# Patient Record
Sex: FEMALE | Race: BLACK OR AFRICAN AMERICAN | URBAN - NONMETROPOLITAN AREA
[De-identification: names, ages, dates, MRNs, and addresses within clinical notes are randomized per-mention and may not be internally consistent; named-entity substitution may affect disease eponyms.]

---

## 2017-02-18 ENCOUNTER — OFFICE VISIT (OUTPATIENT)
Dept: URGENT CARE | Age: 32
End: 2017-02-18
Payer: MEDICAID

## 2017-02-18 VITALS
HEIGHT: 65 IN | DIASTOLIC BLOOD PRESSURE: 85 MMHG | WEIGHT: 229 LBS | TEMPERATURE: 98.9 F | OXYGEN SATURATION: 98 % | HEART RATE: 95 BPM | BODY MASS INDEX: 38.15 KG/M2 | SYSTOLIC BLOOD PRESSURE: 145 MMHG

## 2017-02-18 DIAGNOSIS — J20.9 ACUTE BRONCHITIS, UNSPECIFIED ORGANISM: Primary | ICD-10-CM

## 2017-02-18 DIAGNOSIS — J98.01 BRONCHOSPASM: ICD-10-CM

## 2017-02-18 PROCEDURE — 99202 OFFICE O/P NEW SF 15 MIN: CPT | Performed by: FAMILY MEDICINE

## 2017-02-18 RX ORDER — METHYLPREDNISOLONE 4 MG/1
TABLET ORAL
Qty: 21 TABLET | Refills: 0 | Status: SHIPPED | OUTPATIENT
Start: 2017-02-18 | End: 2017-02-24

## 2017-02-18 RX ORDER — HYDROCHLOROTHIAZIDE 25 MG/1
TABLET ORAL
Refills: 5 | COMMUNITY
Start: 2017-02-10

## 2017-02-18 RX ORDER — AMLODIPINE BESYLATE 10 MG/1
TABLET ORAL
Refills: 5 | COMMUNITY
Start: 2017-02-10

## 2017-02-18 RX ORDER — DEXTROMETHORPHAN HYDROBROMIDE AND PROMETHAZINE HYDROCHLORIDE 15; 6.25 MG/5ML; MG/5ML
5 SYRUP ORAL 4 TIMES DAILY PRN
Qty: 150 ML | Refills: 0 | Status: SHIPPED | OUTPATIENT
Start: 2017-02-18 | End: 2017-02-25

## 2017-02-18 RX ORDER — AMOXICILLIN 875 MG/1
875 TABLET, COATED ORAL 2 TIMES DAILY
Qty: 14 TABLET | Refills: 0 | Status: SHIPPED | OUTPATIENT
Start: 2017-02-18 | End: 2017-02-25

## 2017-02-18 ASSESSMENT — ENCOUNTER SYMPTOMS
RHINORRHEA: 1
SORE THROAT: 1
WHEEZING: 1
VOMITING: 0
SINUS PAIN: 1
COUGH: 1

## 2017-04-20 ENCOUNTER — APPOINTMENT (OUTPATIENT)
Dept: GENERAL RADIOLOGY | Facility: HOSPITAL | Age: 32
End: 2017-04-20

## 2017-04-20 PROCEDURE — 71020 HC CHEST PA AND LATERAL: CPT

## 2017-05-04 ENCOUNTER — HOSPITAL ENCOUNTER (EMERGENCY)
Facility: HOSPITAL | Age: 32
Discharge: HOME | End: 2017-05-04
Attending: FAMILY MEDICINE | Admitting: FAMILY MEDICINE

## 2017-05-04 ENCOUNTER — APPOINTMENT (OUTPATIENT)
Dept: GENERAL RADIOLOGY | Facility: HOSPITAL | Age: 32
End: 2017-05-04

## 2017-05-04 VITALS
DIASTOLIC BLOOD PRESSURE: 85 MMHG | OXYGEN SATURATION: 97 % | BODY MASS INDEX: 37.49 KG/M2 | HEIGHT: 65 IN | TEMPERATURE: 98.2 F | HEART RATE: 94 BPM | WEIGHT: 225 LBS | RESPIRATION RATE: 22 BRPM | SYSTOLIC BLOOD PRESSURE: 154 MMHG

## 2017-05-04 DIAGNOSIS — J45.21 REACTIVE AIRWAY DISEASE, MILD INTERMITTENT, WITH ACUTE EXACERBATION: ICD-10-CM

## 2017-05-04 DIAGNOSIS — J40 BRONCHITIS: Primary | ICD-10-CM

## 2017-05-04 PROCEDURE — 94799 UNLISTED PULMONARY SVC/PX: CPT

## 2017-05-04 PROCEDURE — 94640 AIRWAY INHALATION TREATMENT: CPT

## 2017-05-04 PROCEDURE — 96372 THER/PROPH/DIAG INJ SC/IM: CPT

## 2017-05-04 PROCEDURE — 99282 EMERGENCY DEPT VISIT SF MDM: CPT

## 2017-05-04 PROCEDURE — 25010000002 DEXAMETHASONE PER 1 MG: Performed by: FAMILY MEDICINE

## 2017-05-04 PROCEDURE — 71010 HC CHEST PA OR AP: CPT

## 2017-05-04 RX ORDER — IPRATROPIUM BROMIDE AND ALBUTEROL SULFATE 2.5; .5 MG/3ML; MG/3ML
3 SOLUTION RESPIRATORY (INHALATION) ONCE
Status: COMPLETED | OUTPATIENT
Start: 2017-05-04 | End: 2017-05-04

## 2017-05-04 RX ORDER — METHYLPREDNISOLONE 4 MG/1
TABLET ORAL
Qty: 21 TABLET | Refills: 0 | Status: SHIPPED | OUTPATIENT
Start: 2017-05-04 | End: 2017-05-29

## 2017-05-04 RX ORDER — AZITHROMYCIN 250 MG/1
250 TABLET, FILM COATED ORAL DAILY
Qty: 6 TABLET | Refills: 0 | Status: SHIPPED | OUTPATIENT
Start: 2017-05-04 | End: 2017-05-29

## 2017-05-04 RX ORDER — DEXAMETHASONE SODIUM PHOSPHATE 10 MG/ML
10 INJECTION INTRAMUSCULAR; INTRAVENOUS ONCE
Status: COMPLETED | OUTPATIENT
Start: 2017-05-04 | End: 2017-05-04

## 2017-05-04 RX ORDER — CETIRIZINE HYDROCHLORIDE 10 MG/1
10 TABLET ORAL DAILY
Qty: 15 TABLET | Refills: 0 | Status: SHIPPED | OUTPATIENT
Start: 2017-05-04 | End: 2017-05-19

## 2017-05-04 RX ADMIN — DEXAMETHASONE SODIUM PHOSPHATE 10 MG: 10 INJECTION, SOLUTION INTRAMUSCULAR; INTRAVENOUS at 05:35

## 2017-05-04 RX ADMIN — IPRATROPIUM BROMIDE AND ALBUTEROL SULFATE 3 ML: .5; 3 SOLUTION RESPIRATORY (INHALATION) at 05:44

## 2017-05-29 ENCOUNTER — APPOINTMENT (OUTPATIENT)
Dept: GENERAL RADIOLOGY | Facility: HOSPITAL | Age: 32
End: 2017-05-29

## 2017-05-29 ENCOUNTER — HOSPITAL ENCOUNTER (INPATIENT)
Facility: HOSPITAL | Age: 32
LOS: 3 days | Discharge: HOME | End: 2017-06-01
Attending: EMERGENCY MEDICINE | Admitting: FAMILY MEDICINE

## 2017-05-29 DIAGNOSIS — R09.02 HYPOXIA: Primary | ICD-10-CM

## 2017-05-29 LAB
ALBUMIN SERPL-MCNC: 4.3 G/DL (ref 3.5–5)
ALBUMIN/GLOB SERPL: 1.3 G/DL (ref 1.1–2.5)
ALP SERPL-CCNC: 96 U/L (ref 24–120)
ALT SERPL W P-5'-P-CCNC: 25 U/L (ref 0–54)
ANION GAP SERPL CALCULATED.3IONS-SCNC: 16 MMOL/L (ref 4–13)
ARTERIAL PATENCY WRIST A: ABNORMAL
AST SERPL-CCNC: 31 U/L (ref 7–45)
ATMOSPHERIC PRESS: ABNORMAL MMHG
BASE EXCESS BLDA CALC-SCNC: 1.2 MMOL/L (ref -2–2)
BASOPHILS # BLD AUTO: 0.02 10*3/MM3 (ref 0–0.2)
BASOPHILS NFR BLD AUTO: 0.1 % (ref 0–2)
BDY SITE: ABNORMAL
BILIRUB SERPL-MCNC: 0.5 MG/DL (ref 0.1–1)
BUN BLD-MCNC: 14 MG/DL (ref 5–21)
BUN/CREAT SERPL: 13.1 (ref 7–25)
CALCIUM SPEC-SCNC: 9.4 MG/DL (ref 8.4–10.4)
CHLORIDE SERPL-SCNC: 103 MMOL/L (ref 98–110)
CO2 SERPL-SCNC: 23 MMOL/L (ref 24–31)
CREAT BLD-MCNC: 1.07 MG/DL (ref 0.5–1.4)
D DIMER PPP FEU-MCNC: 0.44 MG/L (FEU) (ref 0–0.5)
DEPRECATED RDW RBC AUTO: 40.8 FL (ref 40–54)
EOSINOPHIL # BLD AUTO: 0.63 10*3/MM3 (ref 0–0.7)
EOSINOPHIL NFR BLD AUTO: 3.8 % (ref 0–4)
ERYTHROCYTE [DISTWIDTH] IN BLOOD BY AUTOMATED COUNT: 13.1 % (ref 12–15)
GAS FLOW AIRWAY: 2 LPM
GFR SERPL CREATININE-BSD FRML MDRD: 72 ML/MIN/1.73
GLOBULIN UR ELPH-MCNC: 3.3 GM/DL
GLUCOSE BLD-MCNC: 96 MG/DL (ref 70–100)
HCO3 BLDA-SCNC: 23.7 MMOL/L (ref 22–26)
HCT VFR BLD AUTO: 37 % (ref 37–47)
HGB BLD-MCNC: 12.8 G/DL (ref 12–16)
LYMPHOCYTES # BLD AUTO: 2.28 10*3/MM3 (ref 0.72–4.86)
LYMPHOCYTES NFR BLD AUTO: 13.8 % (ref 15–45)
Lab: ABNORMAL
MCH RBC QN AUTO: 30.5 PG (ref 28–32)
MCHC RBC AUTO-ENTMCNC: 34.6 G/DL (ref 33–36)
MCV RBC AUTO: 88.1 FL (ref 82–98)
MODALITY: ABNORMAL
MONOCYTES # BLD AUTO: 1.18 10*3/MM3 (ref 0.19–1.3)
MONOCYTES NFR BLD AUTO: 7.1 % (ref 4–12)
NEUTROPHILS # BLD AUTO: 12.47 10*3/MM3 (ref 1.87–8.4)
NEUTROPHILS NFR BLD AUTO: 75.2 % (ref 39–78)
NOTIFIED BY: ABNORMAL
NOTIFIED WHO: ABNORMAL
NT-PROBNP SERPL-MCNC: 118 PG/ML (ref 0–450)
PCO2 BLDA: 31.9 MM HG (ref 35–45)
PH BLDA: 7.49 PH UNITS (ref 7.35–7.45)
PLAT MORPH BLD: NORMAL
PLATELET # BLD AUTO: 437 10*3/MM3 (ref 130–400)
PMV BLD AUTO: 10.1 FL (ref 6–12)
PO2 BLDA: 43.5 MM HG (ref 80–100)
POTASSIUM BLD-SCNC: 3.4 MMOL/L (ref 3.5–5.3)
PROT SERPL-MCNC: 7.6 G/DL (ref 6.3–8.7)
RBC # BLD AUTO: 4.2 10*6/MM3 (ref 4.2–5.4)
RBC MORPH BLD: NORMAL
SAO2 % BLDCOA: 83.7 % (ref 94–100)
SAO2 % BLDCOA: 83.7 % (ref 94–100)
SODIUM BLD-SCNC: 142 MMOL/L (ref 135–145)
WBC MORPH BLD: NORMAL
WBC NRBC COR # BLD: 16.58 10*3/MM3 (ref 4.8–10.8)

## 2017-05-29 PROCEDURE — 25010000002 CEFTRIAXONE: Performed by: INTERNAL MEDICINE

## 2017-05-29 PROCEDURE — 94640 AIRWAY INHALATION TREATMENT: CPT

## 2017-05-29 PROCEDURE — 93010 ELECTROCARDIOGRAM REPORT: CPT | Performed by: INTERNAL MEDICINE

## 2017-05-29 PROCEDURE — 80053 COMPREHEN METABOLIC PANEL: CPT | Performed by: EMERGENCY MEDICINE

## 2017-05-29 PROCEDURE — 94799 UNLISTED PULMONARY SVC/PX: CPT

## 2017-05-29 PROCEDURE — 25010000002 HYDRALAZINE PER 20 MG: Performed by: INTERNAL MEDICINE

## 2017-05-29 PROCEDURE — 25010000002 METHYLPREDNISOLONE PER 125 MG: Performed by: EMERGENCY MEDICINE

## 2017-05-29 PROCEDURE — 85007 BL SMEAR W/DIFF WBC COUNT: CPT | Performed by: EMERGENCY MEDICINE

## 2017-05-29 PROCEDURE — 82803 BLOOD GASES ANY COMBINATION: CPT

## 2017-05-29 PROCEDURE — 93005 ELECTROCARDIOGRAM TRACING: CPT | Performed by: EMERGENCY MEDICINE

## 2017-05-29 PROCEDURE — 25010000002 ENOXAPARIN PER 10 MG: Performed by: INTERNAL MEDICINE

## 2017-05-29 PROCEDURE — 71010 HC CHEST PA OR AP: CPT

## 2017-05-29 PROCEDURE — 25010000002 METHYLPREDNISOLONE PER 40 MG: Performed by: INTERNAL MEDICINE

## 2017-05-29 PROCEDURE — 25010000002 AZITHROMYCIN: Performed by: INTERNAL MEDICINE

## 2017-05-29 PROCEDURE — 36600 WITHDRAWAL OF ARTERIAL BLOOD: CPT

## 2017-05-29 PROCEDURE — 99284 EMERGENCY DEPT VISIT MOD MDM: CPT

## 2017-05-29 PROCEDURE — 83880 ASSAY OF NATRIURETIC PEPTIDE: CPT | Performed by: EMERGENCY MEDICINE

## 2017-05-29 PROCEDURE — 85379 FIBRIN DEGRADATION QUANT: CPT | Performed by: EMERGENCY MEDICINE

## 2017-05-29 PROCEDURE — 85025 COMPLETE CBC W/AUTO DIFF WBC: CPT | Performed by: EMERGENCY MEDICINE

## 2017-05-29 PROCEDURE — 25010000002 MAGNESIUM SULFATE IN D5W 1G/100ML (PREMIX) 1-5 GM/100ML-% SOLUTION: Performed by: INTERNAL MEDICINE

## 2017-05-29 RX ORDER — IPRATROPIUM BROMIDE AND ALBUTEROL SULFATE 2.5; .5 MG/3ML; MG/3ML
3 SOLUTION RESPIRATORY (INHALATION) ONCE
Status: COMPLETED | OUTPATIENT
Start: 2017-05-29 | End: 2017-05-29

## 2017-05-29 RX ORDER — POTASSIUM CHLORIDE 750 MG/1
40 CAPSULE, EXTENDED RELEASE ORAL ONCE
Status: COMPLETED | OUTPATIENT
Start: 2017-05-29 | End: 2017-05-29

## 2017-05-29 RX ORDER — ONDANSETRON 2 MG/ML
4 INJECTION INTRAMUSCULAR; INTRAVENOUS EVERY 6 HOURS PRN
Status: DISCONTINUED | OUTPATIENT
Start: 2017-05-29 | End: 2017-06-01 | Stop reason: HOSPADM

## 2017-05-29 RX ORDER — SODIUM CHLORIDE 9 MG/ML
125 INJECTION, SOLUTION INTRAVENOUS CONTINUOUS
Status: DISCONTINUED | OUTPATIENT
Start: 2017-05-29 | End: 2017-05-30

## 2017-05-29 RX ORDER — METHYLPREDNISOLONE SODIUM SUCCINATE 125 MG/2ML
125 INJECTION, POWDER, LYOPHILIZED, FOR SOLUTION INTRAMUSCULAR; INTRAVENOUS ONCE
Status: COMPLETED | OUTPATIENT
Start: 2017-05-29 | End: 2017-05-29

## 2017-05-29 RX ORDER — AMLODIPINE BESYLATE 10 MG/1
10 TABLET ORAL DAILY
COMMUNITY
End: 2020-08-28

## 2017-05-29 RX ORDER — MAGNESIUM SULFATE 1 G/100ML
1 INJECTION INTRAVENOUS ONCE
Status: COMPLETED | OUTPATIENT
Start: 2017-05-29 | End: 2017-05-29

## 2017-05-29 RX ORDER — HYDROCHLOROTHIAZIDE 25 MG/1
25 TABLET ORAL DAILY
COMMUNITY
End: 2020-08-28

## 2017-05-29 RX ORDER — METHYLPREDNISOLONE SODIUM SUCCINATE 40 MG/ML
40 INJECTION, POWDER, LYOPHILIZED, FOR SOLUTION INTRAMUSCULAR; INTRAVENOUS EVERY 6 HOURS
Status: DISCONTINUED | OUTPATIENT
Start: 2017-05-29 | End: 2017-05-30

## 2017-05-29 RX ORDER — GUAIFENESIN 600 MG/1
1200 TABLET, EXTENDED RELEASE ORAL EVERY 12 HOURS
Status: DISCONTINUED | OUTPATIENT
Start: 2017-05-29 | End: 2017-06-01 | Stop reason: HOSPADM

## 2017-05-29 RX ORDER — HYDROCHLOROTHIAZIDE 25 MG/1
25 TABLET ORAL DAILY
Status: DISCONTINUED | OUTPATIENT
Start: 2017-05-30 | End: 2017-06-01 | Stop reason: HOSPADM

## 2017-05-29 RX ORDER — HYDRALAZINE HYDROCHLORIDE 20 MG/ML
10 INJECTION INTRAMUSCULAR; INTRAVENOUS EVERY 4 HOURS PRN
Status: DISCONTINUED | OUTPATIENT
Start: 2017-05-29 | End: 2017-06-01 | Stop reason: HOSPADM

## 2017-05-29 RX ORDER — IPRATROPIUM BROMIDE AND ALBUTEROL SULFATE 2.5; .5 MG/3ML; MG/3ML
3 SOLUTION RESPIRATORY (INHALATION)
Status: DISCONTINUED | OUTPATIENT
Start: 2017-05-29 | End: 2017-05-29

## 2017-05-29 RX ORDER — SODIUM CHLORIDE 0.9 % (FLUSH) 0.9 %
10 SYRINGE (ML) INJECTION AS NEEDED
Status: DISCONTINUED | OUTPATIENT
Start: 2017-05-29 | End: 2017-06-01 | Stop reason: HOSPADM

## 2017-05-29 RX ORDER — AMLODIPINE BESYLATE 10 MG/1
10 TABLET ORAL DAILY
Status: DISCONTINUED | OUTPATIENT
Start: 2017-05-30 | End: 2017-06-01 | Stop reason: HOSPADM

## 2017-05-29 RX ORDER — DIAZEPAM 2 MG/1
2 TABLET ORAL EVERY 8 HOURS PRN
Status: DISCONTINUED | OUTPATIENT
Start: 2017-05-29 | End: 2017-06-01 | Stop reason: HOSPADM

## 2017-05-29 RX ORDER — ACETAMINOPHEN 325 MG/1
650 TABLET ORAL EVERY 4 HOURS PRN
Status: DISCONTINUED | OUTPATIENT
Start: 2017-05-29 | End: 2017-06-01 | Stop reason: HOSPADM

## 2017-05-29 RX ORDER — ALBUTEROL SULFATE 2.5 MG/3ML
2.5 SOLUTION RESPIRATORY (INHALATION)
Status: DISCONTINUED | OUTPATIENT
Start: 2017-05-29 | End: 2017-06-01 | Stop reason: HOSPADM

## 2017-05-29 RX ORDER — SODIUM CHLORIDE 0.9 % (FLUSH) 0.9 %
1-10 SYRINGE (ML) INJECTION AS NEEDED
Status: DISCONTINUED | OUTPATIENT
Start: 2017-05-29 | End: 2017-06-01 | Stop reason: HOSPADM

## 2017-05-29 RX ADMIN — DIAZEPAM 2 MG: 2 TABLET ORAL at 20:53

## 2017-05-29 RX ADMIN — METHYLPREDNISOLONE SODIUM SUCCINATE 40 MG: 40 INJECTION, POWDER, FOR SOLUTION INTRAMUSCULAR; INTRAVENOUS at 19:52

## 2017-05-29 RX ADMIN — METHYLPREDNISOLONE SODIUM SUCCINATE 125 MG: 125 INJECTION, POWDER, FOR SOLUTION INTRAMUSCULAR; INTRAVENOUS at 10:30

## 2017-05-29 RX ADMIN — AZITHROMYCIN 500 MG: 500 INJECTION, POWDER, LYOPHILIZED, FOR SOLUTION INTRAVENOUS at 16:29

## 2017-05-29 RX ADMIN — ALBUTEROL SULFATE 2.5 MG: 2.5 SOLUTION RESPIRATORY (INHALATION) at 19:38

## 2017-05-29 RX ADMIN — POTASSIUM CHLORIDE 40 MEQ: 750 CAPSULE, EXTENDED RELEASE ORAL at 14:43

## 2017-05-29 RX ADMIN — METHYLPREDNISOLONE SODIUM SUCCINATE 40 MG: 40 INJECTION, POWDER, FOR SOLUTION INTRAMUSCULAR; INTRAVENOUS at 15:00

## 2017-05-29 RX ADMIN — ALBUTEROL SULFATE 2.5 MG: 2.5 SOLUTION RESPIRATORY (INHALATION) at 15:34

## 2017-05-29 RX ADMIN — CEFTRIAXONE 1 G: 1 INJECTION, POWDER, FOR SOLUTION INTRAMUSCULAR; INTRAVENOUS at 14:43

## 2017-05-29 RX ADMIN — IPRATROPIUM BROMIDE AND ALBUTEROL SULFATE 3 ML: 2.5; .5 SOLUTION RESPIRATORY (INHALATION) at 10:10

## 2017-05-29 RX ADMIN — ALBUTEROL SULFATE 2.5 MG: 2.5 SOLUTION RESPIRATORY (INHALATION) at 23:42

## 2017-05-29 RX ADMIN — MAGNESIUM SULFATE HEPTAHYDRATE 1 G: 1 INJECTION, SOLUTION INTRAVENOUS at 17:48

## 2017-05-29 RX ADMIN — ENOXAPARIN SODIUM 40 MG: 40 INJECTION SUBCUTANEOUS at 14:43

## 2017-05-29 RX ADMIN — SODIUM CHLORIDE 125 ML/HR: 9 INJECTION, SOLUTION INTRAVENOUS at 14:42

## 2017-05-29 RX ADMIN — SODIUM CHLORIDE 125 ML/HR: 9 INJECTION, SOLUTION INTRAVENOUS at 11:00

## 2017-05-29 RX ADMIN — HYDRALAZINE HYDROCHLORIDE 10 MG: 20 INJECTION INTRAMUSCULAR; INTRAVENOUS at 20:53

## 2017-05-29 RX ADMIN — PHENOL 1 SPRAY: 1.5 LIQUID ORAL at 20:52

## 2017-05-29 RX ADMIN — GUAIFENESIN 1200 MG: 600 TABLET, EXTENDED RELEASE ORAL at 19:52

## 2017-05-30 ENCOUNTER — APPOINTMENT (OUTPATIENT)
Dept: CARDIOLOGY | Facility: HOSPITAL | Age: 32
End: 2017-05-30
Attending: INTERNAL MEDICINE

## 2017-05-30 LAB
ANION GAP SERPL CALCULATED.3IONS-SCNC: 13 MMOL/L (ref 4–13)
ARTERIAL PATENCY WRIST A: ABNORMAL
ATMOSPHERIC PRESS: ABNORMAL MMHG
BASE EXCESS BLDA CALC-SCNC: -1.7 MMOL/L (ref -2–2)
BDY SITE: ABNORMAL
BH CV ECHO MEAS - AO MAX PG (FULL): 8.1 MMHG
BH CV ECHO MEAS - AO MAX PG: 18.5 MMHG
BH CV ECHO MEAS - AO MEAN PG (FULL): 5 MMHG
BH CV ECHO MEAS - AO MEAN PG: 11 MMHG
BH CV ECHO MEAS - AO ROOT AREA (BSA CORRECTED): 1.5
BH CV ECHO MEAS - AO ROOT AREA: 7.5 CM^2
BH CV ECHO MEAS - AO ROOT DIAM: 3.1 CM
BH CV ECHO MEAS - AO V2 MAX: 215 CM/SEC
BH CV ECHO MEAS - AO V2 MEAN: 153 CM/SEC
BH CV ECHO MEAS - AO V2 VTI: 43.1 CM
BH CV ECHO MEAS - AVA(I,A): 2.6 CM^2
BH CV ECHO MEAS - AVA(I,D): 2.6 CM^2
BH CV ECHO MEAS - AVA(V,A): 2.6 CM^2
BH CV ECHO MEAS - AVA(V,D): 2.6 CM^2
BH CV ECHO MEAS - BSA(HAYCOCK): 2.2 M^2
BH CV ECHO MEAS - BSA: 2.1 M^2
BH CV ECHO MEAS - BZI_BMI: 37.1 KILOGRAMS/M^2
BH CV ECHO MEAS - BZI_METRIC_HEIGHT: 165.1 CM
BH CV ECHO MEAS - BZI_METRIC_WEIGHT: 101.2 KG
BH CV ECHO MEAS - EDV(CUBED): 88.7 ML
BH CV ECHO MEAS - EDV(TEICH): 90.5 ML
BH CV ECHO MEAS - EF(CUBED): 87.5 %
BH CV ECHO MEAS - EF(TEICH): 81.5 %
BH CV ECHO MEAS - ESV(CUBED): 11.1 ML
BH CV ECHO MEAS - ESV(TEICH): 16.8 ML
BH CV ECHO MEAS - FS: 50 %
BH CV ECHO MEAS - IVS/LVPW: 0.85
BH CV ECHO MEAS - IVSD: 0.93 CM
BH CV ECHO MEAS - LA DIMENSION: 3.5 CM
BH CV ECHO MEAS - LA/AO: 1.1
BH CV ECHO MEAS - LAT PEAK E' VEL: 14.4 CM/SEC
BH CV ECHO MEAS - LV MASS(C)D: 153.1 GRAMS
BH CV ECHO MEAS - LV MASS(C)DI: 73.9 GRAMS/M^2
BH CV ECHO MEAS - LV MAX PG: 10.4 MMHG
BH CV ECHO MEAS - LV MEAN PG: 6 MMHG
BH CV ECHO MEAS - LV V1 MAX: 161 CM/SEC
BH CV ECHO MEAS - LV V1 MEAN: 109 CM/SEC
BH CV ECHO MEAS - LV V1 VTI: 31.8 CM
BH CV ECHO MEAS - LVIDD: 4.5 CM
BH CV ECHO MEAS - LVIDS: 2.2 CM
BH CV ECHO MEAS - LVOT AREA (M): 3.5 CM^2
BH CV ECHO MEAS - LVOT AREA: 3.5 CM^2
BH CV ECHO MEAS - LVOT DIAM: 2.1 CM
BH CV ECHO MEAS - LVPWD: 1.1 CM
BH CV ECHO MEAS - MED PEAK E' VEL: 8.92 CM/SEC
BH CV ECHO MEAS - MV A MAX VEL: 97.6 CM/SEC
BH CV ECHO MEAS - MV DEC TIME: 0.2 SEC
BH CV ECHO MEAS - MV E MAX VEL: 91 CM/SEC
BH CV ECHO MEAS - MV E/A: 0.93
BH CV ECHO MEAS - SI(AO): 157 ML/M^2
BH CV ECHO MEAS - SI(CUBED): 37.5 ML/M^2
BH CV ECHO MEAS - SI(LVOT): 53.2 ML/M^2
BH CV ECHO MEAS - SI(TEICH): 35.6 ML/M^2
BH CV ECHO MEAS - SV(AO): 325.3 ML
BH CV ECHO MEAS - SV(CUBED): 77.6 ML
BH CV ECHO MEAS - SV(LVOT): 110.1 ML
BH CV ECHO MEAS - SV(TEICH): 73.8 ML
BUN BLD-MCNC: 12 MG/DL (ref 5–21)
BUN/CREAT SERPL: 14.3 (ref 7–25)
CALCIUM SPEC-SCNC: 9.2 MG/DL (ref 8.4–10.4)
CHLORIDE SERPL-SCNC: 106 MMOL/L (ref 98–110)
CO2 SERPL-SCNC: 21 MMOL/L (ref 24–31)
CREAT BLD-MCNC: 0.84 MG/DL (ref 0.5–1.4)
DEPRECATED RDW RBC AUTO: 42.5 FL (ref 40–54)
E/E' RATIO: 10.2
ERYTHROCYTE [DISTWIDTH] IN BLOOD BY AUTOMATED COUNT: 13.3 % (ref 12–15)
GAS FLOW AIRWAY: 10 LPM
GFR SERPL CREATININE-BSD FRML MDRD: 95 ML/MIN/1.73
GLUCOSE BLD-MCNC: 175 MG/DL (ref 70–100)
HCO3 BLDA-SCNC: 20.5 MMOL/L (ref 22–26)
HCT VFR BLD AUTO: 35.3 % (ref 37–47)
HGB BLD-MCNC: 12.1 G/DL (ref 12–16)
LEFT ATRIUM VOLUME INDEX: 27.2 ML/M2
LEFT ATRIUM VOLUME: 56.3 CM3
MAGNESIUM SERPL-MCNC: 2.1 MG/DL (ref 1.4–2.2)
MCH RBC QN AUTO: 29.9 PG (ref 28–32)
MCHC RBC AUTO-ENTMCNC: 34.3 G/DL (ref 33–36)
MCV RBC AUTO: 87.2 FL (ref 82–98)
MODALITY: ABNORMAL
PCO2 BLDA: 28.3 MM HG (ref 35–45)
PH BLDA: 7.48 PH UNITS (ref 7.35–7.45)
PLATELET # BLD AUTO: 400 10*3/MM3 (ref 130–400)
PMV BLD AUTO: 9.5 FL (ref 6–12)
PO2 BLDA: 58.9 MM HG (ref 80–100)
POTASSIUM BLD-SCNC: 4 MMOL/L (ref 3.5–5.3)
RBC # BLD AUTO: 4.05 10*6/MM3 (ref 4.2–5.4)
SAO2 % BLDCOA: 92.6 % (ref 94–100)
SAO2 % BLDCOA: 92.6 % (ref 94–100)
SODIUM BLD-SCNC: 140 MMOL/L (ref 135–145)
WBC NRBC COR # BLD: 17.21 10*3/MM3 (ref 4.8–10.8)

## 2017-05-30 PROCEDURE — 82803 BLOOD GASES ANY COMBINATION: CPT

## 2017-05-30 PROCEDURE — 80048 BASIC METABOLIC PNL TOTAL CA: CPT | Performed by: INTERNAL MEDICINE

## 2017-05-30 PROCEDURE — 93306 TTE W/DOPPLER COMPLETE: CPT

## 2017-05-30 PROCEDURE — 25010000002 METHYLPREDNISOLONE PER 40 MG: Performed by: INTERNAL MEDICINE

## 2017-05-30 PROCEDURE — 25010000002 AZITHROMYCIN: Performed by: INTERNAL MEDICINE

## 2017-05-30 PROCEDURE — 25010000002 ENOXAPARIN PER 10 MG: Performed by: INTERNAL MEDICINE

## 2017-05-30 PROCEDURE — 94760 N-INVAS EAR/PLS OXIMETRY 1: CPT

## 2017-05-30 PROCEDURE — 83735 ASSAY OF MAGNESIUM: CPT | Performed by: INTERNAL MEDICINE

## 2017-05-30 PROCEDURE — 25010000002 METHYLPREDNISOLONE PER 125 MG: Performed by: INTERNAL MEDICINE

## 2017-05-30 PROCEDURE — 94799 UNLISTED PULMONARY SVC/PX: CPT

## 2017-05-30 PROCEDURE — 85027 COMPLETE CBC AUTOMATED: CPT | Performed by: INTERNAL MEDICINE

## 2017-05-30 PROCEDURE — 25010000002 FUROSEMIDE PER 20 MG

## 2017-05-30 PROCEDURE — 25010000002 CEFTRIAXONE: Performed by: INTERNAL MEDICINE

## 2017-05-30 PROCEDURE — 94660 CPAP INITIATION&MGMT: CPT

## 2017-05-30 PROCEDURE — 36600 WITHDRAWAL OF ARTERIAL BLOOD: CPT

## 2017-05-30 PROCEDURE — 93306 TTE W/DOPPLER COMPLETE: CPT | Performed by: INTERNAL MEDICINE

## 2017-05-30 RX ORDER — METHYLPREDNISOLONE SODIUM SUCCINATE 40 MG/ML
40 INJECTION, POWDER, LYOPHILIZED, FOR SOLUTION INTRAMUSCULAR; INTRAVENOUS EVERY 6 HOURS
Status: DISCONTINUED | OUTPATIENT
Start: 2017-05-30 | End: 2017-06-01

## 2017-05-30 RX ORDER — FUROSEMIDE 10 MG/ML
20 INJECTION INTRAMUSCULAR; INTRAVENOUS ONCE
Status: COMPLETED | OUTPATIENT
Start: 2017-05-30 | End: 2017-05-30

## 2017-05-30 RX ORDER — FUROSEMIDE 10 MG/ML
INJECTION INTRAMUSCULAR; INTRAVENOUS
Status: COMPLETED
Start: 2017-05-30 | End: 2017-05-30

## 2017-05-30 RX ORDER — METHYLPREDNISOLONE SODIUM SUCCINATE 125 MG/2ML
60 INJECTION, POWDER, LYOPHILIZED, FOR SOLUTION INTRAMUSCULAR; INTRAVENOUS EVERY 6 HOURS
Status: DISCONTINUED | OUTPATIENT
Start: 2017-05-30 | End: 2017-05-30

## 2017-05-30 RX ADMIN — CEFTRIAXONE 1 G: 1 INJECTION, POWDER, FOR SOLUTION INTRAMUSCULAR; INTRAVENOUS at 15:21

## 2017-05-30 RX ADMIN — METHYLPREDNISOLONE SODIUM SUCCINATE 60 MG: 125 INJECTION, POWDER, FOR SOLUTION INTRAMUSCULAR; INTRAVENOUS at 02:35

## 2017-05-30 RX ADMIN — METHYLPREDNISOLONE SODIUM SUCCINATE 40 MG: 125 INJECTION, POWDER, FOR SOLUTION INTRAMUSCULAR; INTRAVENOUS at 21:04

## 2017-05-30 RX ADMIN — FUROSEMIDE 20 MG: 10 INJECTION, SOLUTION INTRAMUSCULAR; INTRAVENOUS at 00:08

## 2017-05-30 RX ADMIN — METHYLPREDNISOLONE SODIUM SUCCINATE 60 MG: 125 INJECTION, POWDER, FOR SOLUTION INTRAMUSCULAR; INTRAVENOUS at 10:10

## 2017-05-30 RX ADMIN — METHYLPREDNISOLONE SODIUM SUCCINATE 40 MG: 125 INJECTION, POWDER, FOR SOLUTION INTRAMUSCULAR; INTRAVENOUS at 15:24

## 2017-05-30 RX ADMIN — ALBUTEROL SULFATE 2.5 MG: 2.5 SOLUTION RESPIRATORY (INHALATION) at 03:26

## 2017-05-30 RX ADMIN — ALBUTEROL SULFATE 2.5 MG: 2.5 SOLUTION RESPIRATORY (INHALATION) at 11:20

## 2017-05-30 RX ADMIN — ACETAMINOPHEN 650 MG: 325 TABLET, FILM COATED ORAL at 10:16

## 2017-05-30 RX ADMIN — ALBUTEROL SULFATE 2.5 MG: 2.5 SOLUTION RESPIRATORY (INHALATION) at 23:15

## 2017-05-30 RX ADMIN — ALBUTEROL SULFATE 2.5 MG: 2.5 SOLUTION RESPIRATORY (INHALATION) at 07:11

## 2017-05-30 RX ADMIN — GUAIFENESIN 1200 MG: 600 TABLET, EXTENDED RELEASE ORAL at 21:03

## 2017-05-30 RX ADMIN — ENOXAPARIN SODIUM 40 MG: 40 INJECTION SUBCUTANEOUS at 15:21

## 2017-05-30 RX ADMIN — PHENOL 1 SPRAY: 1.5 LIQUID ORAL at 00:09

## 2017-05-30 RX ADMIN — DIAZEPAM 2 MG: 2 TABLET ORAL at 05:55

## 2017-05-30 RX ADMIN — ALBUTEROL SULFATE 2.5 MG: 2.5 SOLUTION RESPIRATORY (INHALATION) at 14:37

## 2017-05-30 RX ADMIN — ALBUTEROL SULFATE 2.5 MG: 2.5 SOLUTION RESPIRATORY (INHALATION) at 18:54

## 2017-05-30 RX ADMIN — GUAIFENESIN 1200 MG: 600 TABLET, EXTENDED RELEASE ORAL at 10:11

## 2017-05-30 RX ADMIN — AZITHROMYCIN 500 MG: 500 INJECTION, POWDER, LYOPHILIZED, FOR SOLUTION INTRAVENOUS at 16:30

## 2017-05-30 RX ADMIN — HYDROCHLOROTHIAZIDE 25 MG: 25 TABLET ORAL at 10:11

## 2017-05-30 RX ADMIN — AMLODIPINE BESYLATE 10 MG: 10 TABLET ORAL at 10:11

## 2017-05-30 RX ADMIN — FUROSEMIDE 20 MG: 10 INJECTION INTRAMUSCULAR; INTRAVENOUS at 00:08

## 2017-05-31 ENCOUNTER — APPOINTMENT (OUTPATIENT)
Dept: CT IMAGING | Facility: HOSPITAL | Age: 32
End: 2017-05-31

## 2017-05-31 PROBLEM — Q21.12 PFO (PATENT FORAMEN OVALE): Status: ACTIVE | Noted: 2017-05-31

## 2017-05-31 PROBLEM — I10 HYPERTENSION: Status: ACTIVE | Noted: 2017-05-31

## 2017-05-31 PROBLEM — J40 BRONCHITIS: Status: ACTIVE | Noted: 2017-05-31

## 2017-05-31 PROCEDURE — 0 IOPAMIDOL PER 1 ML: Performed by: FAMILY MEDICINE

## 2017-05-31 PROCEDURE — 25010000002 ENOXAPARIN PER 10 MG: Performed by: INTERNAL MEDICINE

## 2017-05-31 PROCEDURE — 25010000002 AZITHROMYCIN: Performed by: INTERNAL MEDICINE

## 2017-05-31 PROCEDURE — 99253 IP/OBS CNSLTJ NEW/EST LOW 45: CPT | Performed by: NURSE PRACTITIONER

## 2017-05-31 PROCEDURE — 94668 MNPJ CHEST WALL SBSQ: CPT

## 2017-05-31 PROCEDURE — 94799 UNLISTED PULMONARY SVC/PX: CPT

## 2017-05-31 PROCEDURE — 87070 CULTURE OTHR SPECIMN AEROBIC: CPT | Performed by: NURSE PRACTITIONER

## 2017-05-31 PROCEDURE — 71275 CT ANGIOGRAPHY CHEST: CPT

## 2017-05-31 PROCEDURE — 94669 MECHANICAL CHEST WALL OSCILL: CPT

## 2017-05-31 PROCEDURE — 94760 N-INVAS EAR/PLS OXIMETRY 1: CPT

## 2017-05-31 PROCEDURE — 25010000002 CEFTRIAXONE: Performed by: INTERNAL MEDICINE

## 2017-05-31 PROCEDURE — 87205 SMEAR GRAM STAIN: CPT | Performed by: NURSE PRACTITIONER

## 2017-05-31 PROCEDURE — 25010000002 METHYLPREDNISOLONE PER 40 MG: Performed by: INTERNAL MEDICINE

## 2017-05-31 RX ORDER — ACETYLCYSTEINE 200 MG/ML
1.5 SOLUTION ORAL; RESPIRATORY (INHALATION) EVERY 12 HOURS SCHEDULED
Status: DISCONTINUED | OUTPATIENT
Start: 2017-05-31 | End: 2017-06-01 | Stop reason: HOSPADM

## 2017-05-31 RX ADMIN — AMLODIPINE BESYLATE 10 MG: 10 TABLET ORAL at 08:51

## 2017-05-31 RX ADMIN — ACETAMINOPHEN 650 MG: 325 TABLET, FILM COATED ORAL at 17:10

## 2017-05-31 RX ADMIN — HYDROCHLOROTHIAZIDE 25 MG: 25 TABLET ORAL at 08:51

## 2017-05-31 RX ADMIN — ACETAMINOPHEN 650 MG: 325 TABLET, FILM COATED ORAL at 21:49

## 2017-05-31 RX ADMIN — GUAIFENESIN 1200 MG: 600 TABLET, EXTENDED RELEASE ORAL at 08:51

## 2017-05-31 RX ADMIN — METHYLPREDNISOLONE SODIUM SUCCINATE 40 MG: 125 INJECTION, POWDER, FOR SOLUTION INTRAMUSCULAR; INTRAVENOUS at 09:48

## 2017-05-31 RX ADMIN — GUAIFENESIN 1200 MG: 600 TABLET, EXTENDED RELEASE ORAL at 21:49

## 2017-05-31 RX ADMIN — ACETYLCYSTEINE 1.5 ML: 200 SOLUTION ORAL; RESPIRATORY (INHALATION) at 19:32

## 2017-05-31 RX ADMIN — METHYLPREDNISOLONE SODIUM SUCCINATE 40 MG: 125 INJECTION, POWDER, FOR SOLUTION INTRAMUSCULAR; INTRAVENOUS at 21:49

## 2017-05-31 RX ADMIN — ALBUTEROL SULFATE 2.5 MG: 2.5 SOLUTION RESPIRATORY (INHALATION) at 10:31

## 2017-05-31 RX ADMIN — METHYLPREDNISOLONE SODIUM SUCCINATE 40 MG: 125 INJECTION, POWDER, FOR SOLUTION INTRAMUSCULAR; INTRAVENOUS at 04:48

## 2017-05-31 RX ADMIN — IOPAMIDOL 150 ML: 755 INJECTION, SOLUTION INTRAVENOUS at 10:45

## 2017-05-31 RX ADMIN — METHYLPREDNISOLONE SODIUM SUCCINATE 40 MG: 125 INJECTION, POWDER, FOR SOLUTION INTRAMUSCULAR; INTRAVENOUS at 17:06

## 2017-05-31 RX ADMIN — ALBUTEROL SULFATE 2.5 MG: 2.5 SOLUTION RESPIRATORY (INHALATION) at 03:04

## 2017-05-31 RX ADMIN — ALBUTEROL SULFATE 2.5 MG: 2.5 SOLUTION RESPIRATORY (INHALATION) at 23:44

## 2017-05-31 RX ADMIN — ACETAMINOPHEN 650 MG: 325 TABLET, FILM COATED ORAL at 08:50

## 2017-05-31 RX ADMIN — ALBUTEROL SULFATE 2.5 MG: 2.5 SOLUTION RESPIRATORY (INHALATION) at 06:43

## 2017-05-31 RX ADMIN — ALBUTEROL SULFATE 2.5 MG: 2.5 SOLUTION RESPIRATORY (INHALATION) at 14:53

## 2017-05-31 RX ADMIN — ENOXAPARIN SODIUM 40 MG: 40 INJECTION SUBCUTANEOUS at 14:26

## 2017-05-31 RX ADMIN — ALBUTEROL SULFATE 2.5 MG: 2.5 SOLUTION RESPIRATORY (INHALATION) at 19:32

## 2017-05-31 RX ADMIN — AZITHROMYCIN 500 MG: 500 INJECTION, POWDER, LYOPHILIZED, FOR SOLUTION INTRAVENOUS at 15:41

## 2017-05-31 RX ADMIN — CEFTRIAXONE 1 G: 1 INJECTION, POWDER, FOR SOLUTION INTRAMUSCULAR; INTRAVENOUS at 14:26

## 2017-06-01 VITALS
RESPIRATION RATE: 20 BRPM | HEIGHT: 65 IN | BODY MASS INDEX: 38.39 KG/M2 | DIASTOLIC BLOOD PRESSURE: 70 MMHG | WEIGHT: 230.4 LBS | HEART RATE: 81 BPM | TEMPERATURE: 98.2 F | OXYGEN SATURATION: 93 % | SYSTOLIC BLOOD PRESSURE: 126 MMHG

## 2017-06-01 PROCEDURE — 94669 MECHANICAL CHEST WALL OSCILL: CPT

## 2017-06-01 PROCEDURE — 94668 MNPJ CHEST WALL SBSQ: CPT

## 2017-06-01 PROCEDURE — 63710000001 PREDNISONE PER 1 MG: Performed by: FAMILY MEDICINE

## 2017-06-01 PROCEDURE — 25010000002 METHYLPREDNISOLONE PER 40 MG: Performed by: INTERNAL MEDICINE

## 2017-06-01 PROCEDURE — 94799 UNLISTED PULMONARY SVC/PX: CPT

## 2017-06-01 RX ORDER — DOCUSATE SODIUM 100 MG/1
100 CAPSULE, LIQUID FILLED ORAL 2 TIMES DAILY
Status: DISCONTINUED | OUTPATIENT
Start: 2017-06-01 | End: 2017-06-01 | Stop reason: HOSPADM

## 2017-06-01 RX ORDER — GUAIFENESIN 600 MG/1
1200 TABLET, EXTENDED RELEASE ORAL EVERY 12 HOURS
Qty: 20 TABLET | Refills: 0 | Status: SHIPPED | OUTPATIENT
Start: 2017-06-01 | End: 2019-10-21

## 2017-06-01 RX ORDER — PREDNISONE 20 MG/1
20 TABLET ORAL
Status: DISCONTINUED | OUTPATIENT
Start: 2017-06-01 | End: 2017-06-01 | Stop reason: SDUPTHER

## 2017-06-01 RX ORDER — BUDESONIDE AND FORMOTEROL FUMARATE DIHYDRATE 160; 4.5 UG/1; UG/1
2 AEROSOL RESPIRATORY (INHALATION)
Status: DISCONTINUED | OUTPATIENT
Start: 2017-06-01 | End: 2017-06-01 | Stop reason: HOSPADM

## 2017-06-01 RX ORDER — CEFDINIR 300 MG/1
300 CAPSULE ORAL DAILY
Qty: 5 CAPSULE | Refills: 0 | Status: SHIPPED | OUTPATIENT
Start: 2017-06-01 | End: 2019-10-21

## 2017-06-01 RX ORDER — POLYETHYLENE GLYCOL 3350 17 G/17G
17 POWDER, FOR SOLUTION ORAL DAILY PRN
Status: DISCONTINUED | OUTPATIENT
Start: 2017-06-01 | End: 2017-06-01 | Stop reason: HOSPADM

## 2017-06-01 RX ORDER — HYDROCODONE BITARTRATE AND ACETAMINOPHEN 5; 325 MG/1; MG/1
1 TABLET ORAL EVERY 6 HOURS PRN
Status: DISCONTINUED | OUTPATIENT
Start: 2017-06-01 | End: 2017-06-01 | Stop reason: HOSPADM

## 2017-06-01 RX ORDER — PREDNISONE 20 MG/1
60 TABLET ORAL
Qty: 21 TABLET | Refills: 0 | Status: SHIPPED | OUTPATIENT
Start: 2017-06-01 | End: 2019-10-21

## 2017-06-01 RX ADMIN — ALBUTEROL SULFATE 2.5 MG: 2.5 SOLUTION RESPIRATORY (INHALATION) at 03:41

## 2017-06-01 RX ADMIN — HYDROCHLOROTHIAZIDE 25 MG: 25 TABLET ORAL at 08:20

## 2017-06-01 RX ADMIN — GUAIFENESIN 1200 MG: 600 TABLET, EXTENDED RELEASE ORAL at 08:21

## 2017-06-01 RX ADMIN — AMLODIPINE BESYLATE 10 MG: 10 TABLET ORAL at 08:21

## 2017-06-01 RX ADMIN — POLYETHYLENE GLYCOL 3350 17 G: 17 POWDER, FOR SOLUTION ORAL at 06:25

## 2017-06-01 RX ADMIN — HYDROCODONE BITARTRATE AND ACETAMINOPHEN 1 TABLET: 5; 325 TABLET ORAL at 10:35

## 2017-06-01 RX ADMIN — ACETYLCYSTEINE 1.5 ML: 200 SOLUTION ORAL; RESPIRATORY (INHALATION) at 06:55

## 2017-06-01 RX ADMIN — DOCUSATE SODIUM 100 MG: 100 CAPSULE ORAL at 08:20

## 2017-06-01 RX ADMIN — PREDNISONE 60 MG: 20 TABLET ORAL at 12:15

## 2017-06-01 RX ADMIN — METHYLPREDNISOLONE SODIUM SUCCINATE 40 MG: 125 INJECTION, POWDER, FOR SOLUTION INTRAMUSCULAR; INTRAVENOUS at 04:18

## 2017-06-01 RX ADMIN — ALBUTEROL SULFATE 2.5 MG: 2.5 SOLUTION RESPIRATORY (INHALATION) at 06:43

## 2017-06-01 RX ADMIN — HYDROCODONE BITARTRATE AND ACETAMINOPHEN 1 TABLET: 5; 325 TABLET ORAL at 05:02

## 2017-06-01 RX ADMIN — ALBUTEROL SULFATE 2.5 MG: 2.5 SOLUTION RESPIRATORY (INHALATION) at 10:48

## 2017-06-01 NOTE — PLAN OF CARE
Problem: Patient Care Overview (Adult)  Goal: Plan of Care Review  Outcome: Ongoing (interventions implemented as appropriate)    06/01/17 0515   Coping/Psychosocial Response Interventions   Plan Of Care Reviewed With patient   Patient Care Overview   Progress improving   Outcome Evaluation   Outcome Summary/Follow up Plan Patient stable on RA, improving SOB but does report productive cough with greenish sputum. Patient has had no bowel movement for several days , Prune juice given with no results.       06/01/17 0515   Coping/Psychosocial Response Interventions   Plan Of Care Reviewed With patient   Patient Care Overview   Progress improving   Outcome Evaluation   Outcome Summary/Follow up Plan Pateint stable on RA, improving SOB but does report productive cough with greenish sputum. Patient did short walk in hallway this shift.     MD called daily and PRN medication ordered Patient did short walk in hallway this shift. Patient medicated for C/O headache early in shift with PRN Tylenol but MD called due to no relief.          Problem: Respiratory Insufficiency (Adult)  Goal: Acid/Base Balance  Outcome: Ongoing (interventions implemented as appropriate)  Goal: Effective Ventilation  Outcome: Ongoing (interventions implemented as appropriate)

## 2017-06-01 NOTE — PROGRESS NOTES
[facility] Pulmonary Progress Note    Patient: Lucie Huizar  1985   MR# [id number]   [id number]  06/01/17   9:46 AM  Referring Provider: Suleiman Venegas DO      Problem list:   Patient Active Problem List   Diagnosis   • Hypoxia   • PFO (patent foramen ovale)   • Hypertension   • Bronchitis      Chief Complaint: Cough    Interval history: Cough continues to improve, she walked some yesterday and was surprised that she was fatigued after. I've encouraged her to continue to increase activity.  No new complaints.   Shortness of Breath         Allergy: No Known Allergies    Meds:      acetylcysteine 1.5 mL Nebulization Q12H   albuterol 2.5 mg Nebulization Q4H - RT   amLODIPine 10 mg Oral Daily   azithromycin 500 mg Intravenous Q24H   ceftriaxone 1 g Intravenous Q24H   docusate sodium 100 mg Oral BID   enoxaparin 40 mg Subcutaneous Q24H   guaiFENesin 1,200 mg Oral Q12H   hydrochlorothiazide 25 mg Oral Daily   methylPREDNISolone sodium succinate 40 mg Intravenous Q6H            Review of Systems:   Review of Systems   Respiratory: Positive for shortness of breath.    Constitutional: Positive for activity change. Negative for chills, fatigue and fever.   HENT: Positive for congestion. Negative for ear pain, postnasal drip, rhinorrhea, sinus pressure, sneezing, sore throat and trouble swallowing.   Eyes: Negative.   Respiratory: Positive for cough, shortness of breath and wheezing-all improving. She states she has been told she snores and has had some episodes of possible apnea while asleep witnessed in the past.  Cardiovascular: Negative for chest pain, palpitations and leg swelling.   Gastrointestinal: Negative.   Endocrine: Negative.   Genitourinary: Negative.   Musculoskeletal: Negative.   Neurological: Negative.   Psychiatric/Behavioral: Negative.   Dermatologic: Negative.    Physical Exam:  Vitals:    06/01/17 0820   BP: 126/70   Pulse: 81   Resp: 20   Temp: 98.2 °F (36.8 °C)   SpO2: 93%        Intake/Output Summary (Last 24 hours) at 06/01/17 0946  Last data filed at 06/01/17 0936   Gross per 24 hour   Intake              950 ml   Output                0 ml   Net              950 ml     Physical Exam  General: Pleasant, no distress, sitting up in bed working incentive spirometer.   HEENT: she's off O2. There is some crowding of the posterior pharynx.  Neck: Thick.  Chest: No wheezes today, some scattered rhonchi bilaterally.  Cardiac: SR in 80's.  No definite murmur or gallop.  Abdomen: Obese, soft, BS+  Extremities: No cyanosis clubbing or edema noted.  Neurologic: No focal deficits noted.  Psychiatric: She has an appropriate mood and affect.  Dermatologic: No rash noted  Musculoskeletal: No joint deformities noted.  Lymphatic: No adenopathy palpated.    Data:     Results from last 7 days  Lab Units 05/30/17  0450 05/29/17  1026   WBC 10*3/mm3 17.21* 16.58*   HEMOGLOBIN g/dL 12.1 12.8   PLATELETS 10*3/mm3 400 437*         Results from last 7 days  Lab Units 05/30/17  0450 05/29/17  1026   SODIUM mmol/L 140 142   POTASSIUM mmol/L 4.0 3.4*   BUN mg/dL 12 14   CREATININE mg/dL 0.84 1.07         Results from last 7 days  Lab Units 05/30/17  0021 05/29/17  1021   PH, ARTERIAL pH units 7.477* 7.489*   PCO2, ARTERIAL mm Hg 28.3* 31.9*   PO2 ART mm Hg 58.9* 43.5*         Radiology:  Imaging Results (last 24 hours)     Procedure Component Value Units Date/Time    CT Angiogram Chest With Contrast [[id number]] Collected:  05/31/17 1119     Updated:  05/31/17 1123    Narrative:       EXAMINATION: CT ANGIOGRAM CHEST W CONTRAST-      5/31/2017 10:12 AM CDT     HISTORY: Hypoxemia; R09.02-Hypoxemia     In order to have a CT radiation dose as low as reasonably achievable  Automated Exposure Control was utilized for adjustment of the mA and/or  KV according to patient size.     DLP in mGycm= 522.     CT angiography protocol.   CT imaging with bolus IV contrast injection.   Under concurrent supervision axial,  sagittal, coronal, and  three-dimensional data sets were constructed.     Heart size is at the upper limits of normal.     No thoracic aortic aneurysm or dissection.     Symmetric pulmonary arteries.  Granulomatous calcification is seen at the right hilum.     IV contrast injection is suboptimal due to small IV size.  There is no evidence of pulmonary embolus.     There is posteromedial bibasilar infiltrate which has the appearance of  pneumonia on the left. On the right this could represent pneumonia or  atelectasis.     The upper lobes are clear.  There is no pneumothorax or pulmonary edema.     Summary:  1. Bibasilar infiltrate. Pneumonia is favored on the left.  2. No evidence of pulmonary embolism.                                   This report was finalized on 05/31/2017 11:20 by Dr. Bret Berger MD.        Pulmonary Assessment:  1. Acute hypoxemic respiratory failure,suspect some underlying obstructive airways disease  2. History of chronic tobacco use, in remission x 2 months  3. Probable sleep apnea.  4. History of patent foramen ovale  5. Bilateral infiltrates    Plan:     · Sputum so far normal  · D/c continuous pulse ox  · Continue mucomyst, flutter valve, chest VEST, nebs, incentive and mucinex  · IV Solumedrol stopped, transitioned to PO Prednisone    · Would recommend outpatient PFT's and sleep study     Electronically signed by BRANDT Garcia, 06/01/17, 9:46 AM     Physician substantive contribution:  Pertinent symptoms/interval history include: Improved air movement on chest exam.  Her final CT results are noted.  She may have had some pneumonia on the left but some of these changes are probably due to atelectasis.  Respiratory exam shows pertinent findings of improved air movement on chest exam.  Plan includes: We will add Symbicort to her regimen and she is alright for discharge otherwise with office follow-up with PFTs in a few months.  I have seen and examined patient personally,  performing a face-to-face diagnostic evaluation with plan of care reviewed and developed with APRN and nursing staff. I have addended and/or modified the above history of present illness, physical examination, and assessment and plan to reflect my findings and impressions. Essential elements of the care plan were discussed with APRN above.  Agree with findings and assessment/plan as documented above.    Electronically signed by Marcin Correa MD, on 6/1/2017, 1:01 PM

## 2017-06-01 NOTE — DISCHARGE SUMMARY
[facility] Medicine Services  DISCHARGE SUMMARY       Date of Admission: 5/29/2017  Date of Discharge:  6/1/2017  Primary Care Physician: Donavon Weathers Jr., MD    Discharge Diagnoses:  Patient Active Problem List   Diagnosis   • Hypoxia   • PFO (patent foramen ovale)   • Hypertension   • Bronchitis         Presenting Problem/History of Present Illness:  Hypoxia [R09.02]     Chief Complaint on Day of Discharge:   No significant complaints    History of Present Illness on Day of Discharge:   The patient is significantly improved.  She has been weaned off oxygen entirely.  O2 saturations are 96-98% on room air.  Cardiology is consulted and does not recommend PFO closure and does not feel that PFO is a significant contributor to the patient's dyspnea.    Hospital Course  This is a 31-year-old -American female patient who resides in [place], [place].She sees Dr. Donavon Weathers at [facility] for primary care. She presented to the emergency department today with worsening shortness of breath and wheezing over the last several days. She has been dealing with this off and on for the better part of a month. She was seen at [facility] on April 20th and prescribed a Medrol Dosepak, albuterol, Mucinex, and Tessalon. She says that she felt better for a short period of time and then had another flare. She then presented to the emergency department on May 4th and was seen by Dr. Boone and placed on another Medrol Dosepak in addition to azithromycin. She again had a few days to a week of relief and then recurred with symptoms. She has been using an albuterol inhaler over and over again. She has a cough productive of clear and white sputum. She denies fevers, sweats, or chills. She has been wheezing and having dyspnea on exertion. She says it is harder to walk flat, but she denies any problems with increased lower extremity edema or chest discomfort.  Assessment & Plan   1. Acute  hypoxic respiratory failure.  2. Acute bacterial bronchitis versus community-acquired pneumonia (pulmonary infiltrates are atelectatic versus infiltrative).  3.  Leukocytosis.  4. Tobacco abuse, currently in remission.  5. Systemic arterial hypertension.  6. Mild hypokalemia.   She is admitted my service here at [facility].  Continued held bronchodilators, Mucinex, incentive spirometry, IV Solu-Medrol, and will also place on azithromycin and ceftriaxone for now. No clear pneumonia on chest x-ray.   Her tobacco abuse is currently in remission.    Replace potassium.   Check an ECHO to rule out a possible patent foramen ovale given the degree of her hypoxemia.   Lovenox for DVT prophylaxis.    Initially the patient was placed on high flow oxygen at 12 L.  She tolerated a gradual taper throughout her hospital stay.  Solu-Medrol was tapered throughout her hospital stay as well.  Pulmonology was consulted and they suspected a degree of obstructive airways disease.   2-D echocardiogram was obtained showing a patent foramen ovale with a normal right ventricle.  Cardiology was consulted as a result and did not recommend PFO closure as they did not feel that the patient's symptoms were related to that problem.  The patient continued to improve and was saturating in the mid to upper 90s on room air.  She was felt appropriate for discharge home on oral steroids and oral antibiotics.      Consults:   Pulmonology  Cardiology    Pertinent Test Results:   Lab Results (last 7 days)     Procedure Component Value Units Date/Time    Blood Gas, Arterial [[id number]]  (Abnormal) Collected:  05/29/17 1021    Specimen:  Arterial Blood Updated:  05/29/17 1024     Site Arterial: left radial     Augie's Test --      Documented in Rapid Comm        pH, Arterial 7.489 (H) pH units      pCO2, Arterial 31.9 (L) mm Hg      pO2, Arterial 43.5 (L) mm Hg      HCO3, Arterial 23.7 mmol/L      Base Excess, Arterial 1.2 mmol/L      O2  Saturation, Arterial 83.7 (C) %      O2 Saturation Calculated 83.7 (C) %      Barometric Pressure for Blood Gas -- mmHg       Component not reported at this site.        Modality Cannula     Flow Rate 2.00 lpm      Notified Who dr maxwell     Notified By [id number]     Notified Time 5/29/2017 10:23:55 AM    Narrative:       Serial Number: [id number]    : [id number]    Comprehensive Metabolic Panel [[id number]]  (Abnormal) Collected:  05/29/17 1026    Specimen:  Blood Updated:  05/29/17 1056     Glucose 96 mg/dL      BUN 14 mg/dL      Creatinine 1.07 mg/dL      Sodium 142 mmol/L      Potassium 3.4 (L) mmol/L      Chloride 103 mmol/L      CO2 23.0 (L) mmol/L      Calcium 9.4 mg/dL      Total Protein 7.6 g/dL      Albumin 4.30 g/dL      ALT (SGPT) 25 U/L      AST (SGOT) 31 U/L      Alkaline Phosphatase 96 U/L      Total Bilirubin 0.5 mg/dL      eGFR  African Amer 72 mL/min/1.73      Globulin 3.3 gm/dL      A/G Ratio 1.3 g/dL      BUN/Creatinine Ratio 13.1     Anion Gap 16.0 (H) mmol/L     BNP [[id number]]  (Normal) Collected:  05/29/17 1026    Specimen:  Blood Updated:  05/29/17 1104     proBNP 118.0 pg/mL     D-dimer, Quantitative [[id number]]  (Normal) Collected:  05/29/17 1026    Specimen:  Blood Updated:  05/29/17 1107     D-Dimer, Quantitative 0.44 mg/L (FEU)     CBC Auto Differential [[id number]]  (Abnormal) Collected:  05/29/17 1026    Specimen:  Blood Updated:  05/29/17 1120     WBC 16.58 (H) 10*3/mm3      RBC 4.20 10*6/mm3      Hemoglobin 12.8 g/dL      Hematocrit 37.0 %      MCV 88.1 fL      MCH 30.5 pg      MCHC 34.6 g/dL      RDW 13.1 %      RDW-SD 40.8 fl      MPV 10.1 fL      Platelets 437 (H) 10*3/mm3      Neutrophil % 75.2 %      Lymphocyte % 13.8 (L) %      Monocyte % 7.1 %      Eosinophil % 3.8 %      Basophil % 0.1 %      Neutrophils, Absolute 12.47 (H) 10*3/mm3      Lymphocytes, Absolute 2.28 10*3/mm3      Monocytes, Absolute 1.18 10*3/mm3      Eosinophils, Absolute 0.63 10*3/mm3      Basophils, Absolute 0.02  10*3/mm3     Narrative:       Appended report.  These results have been appended to a previously verified report.    Scan Slide [[id number]]  (Normal) Collected:  05/29/17 1026    Specimen:  Blood Updated:  05/29/17 1120     RBC Morphology Normal     WBC Morphology Normal     Platelet Morphology Normal    Blood Gas, Arterial [[id number]]  (Abnormal) Collected:  05/30/17 0021    Specimen:  Arterial Blood Updated:  05/30/17 0027     Site Arterial: right radial     Augie's Test --      Documented in Rapid Comm        pH, Arterial 7.477 (H) pH units      pCO2, Arterial 28.3 (L) mm Hg      pO2, Arterial 58.9 (L) mm Hg      HCO3, Arterial 20.5 (L) mmol/L      Base Excess, Arterial -1.7 mmol/L      O2 Saturation, Arterial 92.6 (L) %      O2 Saturation Calculated 92.6 (L) %      Barometric Pressure for Blood Gas -- mmHg       Component not reported at this site.        Modality Cannula     Flow Rate 10.00 lpm     Narrative:       Serial Number: [id number]    : [id number]    CBC (No Diff) [[id number]]  (Abnormal) Collected:  05/30/17 0450    Specimen:  Blood Updated:  05/30/17 0502     WBC 17.21 (H) 10*3/mm3      RBC 4.05 (L) 10*6/mm3      Hemoglobin 12.1 g/dL      Hematocrit 35.3 (L) %      MCV 87.2 fL      MCH 29.9 pg      MCHC 34.3 g/dL      RDW 13.3 %      RDW-SD 42.5 fl      MPV 9.5 fL      Platelets 400 10*3/mm3     Basic Metabolic Panel [[id number]]  (Abnormal) Collected:  05/30/17 0450    Specimen:  Blood Updated:  05/30/17 0519     Glucose 175 (H) mg/dL      BUN 12 mg/dL      Creatinine 0.84 mg/dL      Sodium 140 mmol/L      Potassium 4.0 mmol/L      Chloride 106 mmol/L      CO2 21.0 (L) mmol/L      Calcium 9.2 mg/dL      eGFR  African Amer 95 mL/min/1.73      BUN/Creatinine Ratio 14.3     Anion Gap 13.0 mmol/L     Narrative:       GFR Normal >60  Chronic Kidney Disease <60  Kidney Failure <15    Magnesium [[id number]]  (Normal) Collected:  05/30/17 0450    Specimen:  Blood Updated:  05/30/17 0519     Magnesium 2.1 mg/dL      Respiratory Culture [[id number]] Collected:  05/31/17 1727    Specimen:  Sputum from Bronchus Updated:  06/01/17 0738     Respiratory Culture Light growth (2+) Normal Respiratory [place]        Imaging Results (last 7 days)     Procedure Component Value Units Date/Time    XR Chest 1 View [[id number]] Collected:  05/29/17 1142     Updated:  05/29/17 1527    Narrative:       EXAMINATION: XR CHEST 1 VW-. 5/29/2017 11:38 AM CDT     CHEST, ONE VIEW:     HISTORY: Cough and shortness of breath     COMPARISON: 5/4/2017 and 4/20/2017     A single frontal chest radiograph was obtained.     FINDINGS:     The level of inspiration is shallow and lung volumes diminished. Mild  crowding of the bronchovascular interstitial markings in the lung bases  observed likely related to the level of inspiration. Acute interstitial  infiltration not excluded however. The lungs are otherwise clear with  calcified granuloma appreciated in the right lower lobe.     The heart is normal in size, pulmonary circulation appropriate, without  heart failure.     The bony structures are intact.                                  Impression:       1. Relatively shallow inspiration with probable mild lower lobe  atelectatic changes. Minimal acute infiltration not excluded. Correlate  with patient presentation.  This report was finalized on 05/29/2017 15:24 by Dr. Tra Russ MD.    CT Angiogram Chest With Contrast [[id number]] Collected:  05/31/17 1119     Updated:  05/31/17 1123    Narrative:       EXAMINATION: CT ANGIOGRAM CHEST W CONTRAST-      5/31/2017 10:12 AM CDT     HISTORY: Hypoxemia; R09.02-Hypoxemia     In order to have a CT radiation dose as low as reasonably achievable  Automated Exposure Control was utilized for adjustment of the mA and/or  KV according to patient size.     DLP in mGycm= 522.     CT angiography protocol.   CT imaging with bolus IV contrast injection.   Under concurrent supervision axial, sagittal, coronal,  "and  three-dimensional data sets were constructed.     Heart size is at the upper limits of normal.     No thoracic aortic aneurysm or dissection.     Symmetric pulmonary arteries.  Granulomatous calcification is seen at the right hilum.     IV contrast injection is suboptimal due to small IV size.  There is no evidence of pulmonary embolus.     There is posteromedial bibasilar infiltrate which has the appearance of  pneumonia on the left. On the right this could represent pneumonia or  atelectasis.     The upper lobes are clear.  There is no pneumothorax or pulmonary edema.     Summary:  1. Bibasilar infiltrate. Pneumonia is favored on the left.  2. No evidence of pulmonary embolism.                                   This report was finalized on 05/31/2017 11:20 by Dr. Bret Berger MD.            Condition on Discharge:    Stable    Physical Exam on Discharge:  /70 (BP Location: Right arm, Patient Position: Lying)  Pulse 81  Temp 98.2 °F (36.8 °C) (Oral)   Resp 20  Ht 65\" (165.1 cm)  Wt 230 lb 6.4 oz (105 kg)  LMP 05/22/2017 (Approximate)  SpO2 93%  Breastfeeding? No  BMI 38.34 kg/m2  Physical Exam  Constitutional: She is oriented to person, place, and time. She appears well-developed and well-nourished.   Up in bed. Comfortable.  No dyspnea noted. Discussed with her nurse.  Nasal O2 has been discontinued  Head: Normocephalic and atraumatic.   Eyes: Conjunctivae and EOM are normal. Pupils are equal, round, and reactive to light.   Neck: Neck supple.   Cardiovascular: Normal rate, regular rhythm, normal heart sounds and intact distal pulses. Exam reveals no gallop and no friction rub.   No murmur heard.  Pulmonary/Chest: Effort normal. No respiratory distress. She has no evidence of wheezing. She has no rales. She exhibits no tenderness.   Abdominal: Soft. Bowel sounds are normal. She exhibits no distension. There is no tenderness. There is no rebound and no guarding.   Musculoskeletal: Normal range " of motion. She exhibits no edema, tenderness or deformity.   Neurological: She is alert and oriented to person, place, and time. She displays normal reflexes. No cranial nerve deficit. She exhibits normal muscle tone.   Skin: Skin is warm and dry. No rash noted.   Psychiatric: She has a normal mood and affect. Her behavior is normal. Judgment and thought content normal.     Discharge Disposition:  Home or Self Care    Discharge Medications:   Lucie Huizar SCOTT   Home Medication Instructions DINO:[id number]    Printed on:06/01/17 1041   Medication Information                      albuterol (PROVENTIL HFA;VENTOLIN HFA) 108 (90 BASE) MCG/ACT inhaler  Inhale 2 puffs Every 4 (Four) Hours As Needed for Wheezing or Shortness of Air.             amLODIPine (NORVASC) 10 MG tablet  Take 10 mg by mouth Daily.             cefdinir (OMNICEF) 300 MG capsule  Take 1 capsule by mouth Daily.             guaiFENesin (MUCINEX) 600 MG 12 hr tablet  Take 2 tablets by mouth Every 12 (Twelve) Hours.             hydrochlorothiazide (HYDRODIURIL) 25 MG tablet  Take 25 mg by mouth Daily.                 Discharge Diet:   Diet Instructions     Diet: Regular; Thin Liquids, No Restrictions       Discharge Diet:  Regular   Fluid Consistency:  Thin Liquids, No Restrictions                 Discharge Care Plan / Instructions:   Discharged to home    Activity at Discharge:   Activity Instructions     Activity as Tolerated           Discharge Activity Restrictions       2) Return to work in 5 days                 Follow-up Appointments:  Primary care physician in one week    Test Results Pending at Discharge:   None     Suleiman Venegas DO  06/01/17  10:41 AM    Time: Discharge 35 min    Please note that portions of this note may have been completed with a voice recognition program. Efforts were made to edit the dictations, but occasionally words are mistranscribed.

## 2017-06-02 LAB
BACTERIA SPEC RESP CULT: NORMAL
GRAM STN SPEC: NORMAL

## 2017-06-02 NOTE — PAYOR COMM NOTE
"FROM: GRAZYNA FREDERICK  PHONE: [phone]  FAX: [phone]        Fany Lange (31 y.o. Female)     Date of Birth Social Security Number Address Home Phone MRN    [id number]  [id number]  [facility] [address] [phone] [id number]    Sabianism Marital Status          None Single       Admission Date Admission Type Admitting Provider Attending Provider Department, Room/Bed    5/29/17 Emergency Suleiman Venegas DO  [facility] 4C, 486/1    Discharge Date Discharge Disposition Discharge Destination        6/1/2017 Home or Self Care             Attending Provider: (none)    Allergies:  No Known Allergies    Isolation:  None   Infection:  None   Code Status:  Prior    Ht:  65\" (165.1 cm)   Wt:  230 lb 6.4 oz (105 kg)    Admission Cmt:  None   Principal Problem:  Hypoxia [R09.02]                 Active Insurance as of 5/29/2017     Primary Coverage     Payor Plan Insurance Group Employer/Plan Group    [employer] [place] [employer] [place]ICA[place]      Payor Plan Address Payor Plan Phone Number Effective From Effective To    [address] [address] [phone] 4/20/2017     [place] [address]       Subscriber Name Subscriber Birth Date Member ID       FANY LANGE 1985 [id number]                 Emergency Contacts      (Rel.) Home Phone Work Phone Mobile Phone    Anca Lange (Mother) [phone] -- --               Discharge Summary      Suleiman Venegas DO at 6/1/2017 10:39 AM              [facility] Medicine Services  DISCHARGE SUMMARY       Date of Admission: 5/29/2017  Date of Discharge:  6/1/2017  Primary Care Physician: Donavon Weathers Jr., MD    Discharge Diagnoses:  Patient Active Problem List   Diagnosis   • Hypoxia   • PFO (patent foramen ovale)   • Hypertension   • Bronchitis         Presenting Problem/History of Present Illness:  Hypoxia [R09.02]     Chief Complaint on Day of Discharge:   No significant complaints    History of Present Illness " on Day of Discharge:   The patient is significantly improved.  She has been weaned off oxygen entirely.  O2 saturations are 96-98% on room air.  Cardiology is consulted and does not recommend PFO closure and does not feel that PFO is a significant contributor to the patient's dyspnea.    Hospital Course  This is a 31-year-old -American female patient who resides in [place], [place].She sees Dr. Donavon Weathers at [facility] for primary care. She presented to the emergency department today with worsening shortness of breath and wheezing over the last several days. She has been dealing with this off and on for the better part of a month. She was seen at [facility] on April 20th and prescribed a Medrol Dosepak, albuterol, Mucinex, and Tessalon. She says that she felt better for a short period of time and then had another flare. She then presented to the emergency department on May 4th and was seen by Dr. Boone and placed on another Medrol Dosepak in addition to azithromycin. She again had a few days to a week of relief and then recurred with symptoms. She has been using an albuterol inhaler over and over again. She has a cough productive of clear and white sputum. She denies fevers, sweats, or chills. She has been wheezing and having dyspnea on exertion. She says it is harder to walk flat, but she denies any problems with increased lower extremity edema or chest discomfort.  Assessment & Plan   1. Acute hypoxic respiratory failure.  2. Acute bacterial bronchitis versus community-acquired pneumonia (pulmonary infiltrates are atelectatic versus infiltrative).  3.  Leukocytosis.  4. Tobacco abuse, currently in remission.  5. Systemic arterial hypertension.  6. Mild hypokalemia.   She is admitted my service here at [facility].  Continued held bronchodilators, Mucinex, incentive spirometry, IV Solu-Medrol, and will also place on azithromycin and ceftriaxone for now. No clear pneumonia on chest  x-ray.   Her tobacco abuse is currently in remission.    Replace potassium.   Check an ECHO to rule out a possible patent foramen ovale given the degree of her hypoxemia.   Lovenox for DVT prophylaxis.    Initially the patient was placed on high flow oxygen at 12 L.  She tolerated a gradual taper throughout her hospital stay.  Solu-Medrol was tapered throughout her hospital stay as well.  Pulmonology was consulted and they suspected a degree of obstructive airways disease.   2-D echocardiogram was obtained showing a patent foramen ovale with a normal right ventricle.  Cardiology was consulted as a result and did not recommend PFO closure as they did not feel that the patient's symptoms were related to that problem.  The patient continued to improve and was saturating in the mid to upper 90s on room air.  She was felt appropriate for discharge home on oral steroids and oral antibiotics.      Consults:   Pulmonology  Cardiology    Pertinent Test Results:   Lab Results (last 7 days)     Procedure Component Value Units Date/Time    Blood Gas, Arterial [[id number]]  (Abnormal) Collected:  05/29/17 1021    Specimen:  Arterial Blood Updated:  05/29/17 1024     Site Arterial: left radial     Augie's Test --      Documented in Rapid Comm        pH, Arterial 7.489 (H) pH units      pCO2, Arterial 31.9 (L) mm Hg      pO2, Arterial 43.5 (L) mm Hg      HCO3, Arterial 23.7 mmol/L      Base Excess, Arterial 1.2 mmol/L      O2 Saturation, Arterial 83.7 (C) %      O2 Saturation Calculated 83.7 (C) %      Barometric Pressure for Blood Gas -- mmHg       Component not reported at this site.        Modality Cannula     Flow Rate 2.00 lpm      Notified Who dr maxwell     Notified By [id number]     Notified Time 5/29/2017 10:23:55 AM    Narrative:       Serial Number: [id number]    : [id number]    Comprehensive Metabolic Panel [[id number]]  (Abnormal) Collected:  05/29/17 1026    Specimen:  Blood Updated:  05/29/17 1056     Glucose 96 mg/dL       BUN 14 mg/dL      Creatinine 1.07 mg/dL      Sodium 142 mmol/L      Potassium 3.4 (L) mmol/L      Chloride 103 mmol/L      CO2 23.0 (L) mmol/L      Calcium 9.4 mg/dL      Total Protein 7.6 g/dL      Albumin 4.30 g/dL      ALT (SGPT) 25 U/L      AST (SGOT) 31 U/L      Alkaline Phosphatase 96 U/L      Total Bilirubin 0.5 mg/dL      eGFR  African Amer 72 mL/min/1.73      Globulin 3.3 gm/dL      A/G Ratio 1.3 g/dL      BUN/Creatinine Ratio 13.1     Anion Gap 16.0 (H) mmol/L     BNP [[id number]]  (Normal) Collected:  05/29/17 1026    Specimen:  Blood Updated:  05/29/17 1104     proBNP 118.0 pg/mL     D-dimer, Quantitative [[id number]]  (Normal) Collected:  05/29/17 1026    Specimen:  Blood Updated:  05/29/17 1107     D-Dimer, Quantitative 0.44 mg/L (FEU)     CBC Auto Differential [[id number]]  (Abnormal) Collected:  05/29/17 1026    Specimen:  Blood Updated:  05/29/17 1120     WBC 16.58 (H) 10*3/mm3      RBC 4.20 10*6/mm3      Hemoglobin 12.8 g/dL      Hematocrit 37.0 %      MCV 88.1 fL      MCH 30.5 pg      MCHC 34.6 g/dL      RDW 13.1 %      RDW-SD 40.8 fl      MPV 10.1 fL      Platelets 437 (H) 10*3/mm3      Neutrophil % 75.2 %      Lymphocyte % 13.8 (L) %      Monocyte % 7.1 %      Eosinophil % 3.8 %      Basophil % 0.1 %      Neutrophils, Absolute 12.47 (H) 10*3/mm3      Lymphocytes, Absolute 2.28 10*3/mm3      Monocytes, Absolute 1.18 10*3/mm3      Eosinophils, Absolute 0.63 10*3/mm3      Basophils, Absolute 0.02 10*3/mm3     Narrative:       Appended report.  These results have been appended to a previously verified report.    Scan Slide [[id number]]  (Normal) Collected:  05/29/17 1026    Specimen:  Blood Updated:  05/29/17 1120     RBC Morphology Normal     WBC Morphology Normal     Platelet Morphology Normal    Blood Gas, Arterial [[id number]]  (Abnormal) Collected:  05/30/17 0021    Specimen:  Arterial Blood Updated:  05/30/17 0027     Site Arterial: right radial     Augie's Test --      Documented in Rapid Comm         pH, Arterial 7.477 (H) pH units      pCO2, Arterial 28.3 (L) mm Hg      pO2, Arterial 58.9 (L) mm Hg      HCO3, Arterial 20.5 (L) mmol/L      Base Excess, Arterial -1.7 mmol/L      O2 Saturation, Arterial 92.6 (L) %      O2 Saturation Calculated 92.6 (L) %      Barometric Pressure for Blood Gas -- mmHg       Component not reported at this site.        Modality Cannula     Flow Rate 10.00 lpm     Narrative:       Serial Number: [id number]    : [id number]    CBC (No Diff) [[id number]]  (Abnormal) Collected:  05/30/17 0450    Specimen:  Blood Updated:  05/30/17 0502     WBC 17.21 (H) 10*3/mm3      RBC 4.05 (L) 10*6/mm3      Hemoglobin 12.1 g/dL      Hematocrit 35.3 (L) %      MCV 87.2 fL      MCH 29.9 pg      MCHC 34.3 g/dL      RDW 13.3 %      RDW-SD 42.5 fl      MPV 9.5 fL      Platelets 400 10*3/mm3     Basic Metabolic Panel [[id number]]  (Abnormal) Collected:  05/30/17 0450    Specimen:  Blood Updated:  05/30/17 0519     Glucose 175 (H) mg/dL      BUN 12 mg/dL      Creatinine 0.84 mg/dL      Sodium 140 mmol/L      Potassium 4.0 mmol/L      Chloride 106 mmol/L      CO2 21.0 (L) mmol/L      Calcium 9.2 mg/dL      eGFR  African Amer 95 mL/min/1.73      BUN/Creatinine Ratio 14.3     Anion Gap 13.0 mmol/L     Narrative:       GFR Normal >60  Chronic Kidney Disease <60  Kidney Failure <15    Magnesium [[id number]]  (Normal) Collected:  05/30/17 0450    Specimen:  Blood Updated:  05/30/17 0519     Magnesium 2.1 mg/dL     Respiratory Culture [[id number]] Collected:  05/31/17 1727    Specimen:  Sputum from Bronchus Updated:  06/01/17 0738     Respiratory Culture Light growth (2+) Normal Respiratory [place]        Imaging Results (last 7 days)     Procedure Component Value Units Date/Time    XR Chest 1 View [[id number]] Collected:  05/29/17 1142     Updated:  05/29/17 1527    Narrative:       EXAMINATION: XR CHEST 1 VW-. 5/29/2017 11:38 AM CDT     CHEST, ONE VIEW:     HISTORY: Cough and shortness of breath     COMPARISON: 5/4/2017  and 4/20/2017     A single frontal chest radiograph was obtained.     FINDINGS:     The level of inspiration is shallow and lung volumes diminished. Mild  crowding of the bronchovascular interstitial markings in the lung bases  observed likely related to the level of inspiration. Acute interstitial  infiltration not excluded however. The lungs are otherwise clear with  calcified granuloma appreciated in the right lower lobe.     The heart is normal in size, pulmonary circulation appropriate, without  heart failure.     The bony structures are intact.                                  Impression:       1. Relatively shallow inspiration with probable mild lower lobe  atelectatic changes. Minimal acute infiltration not excluded. Correlate  with patient presentation.  This report was finalized on 05/29/2017 15:24 by Dr. Tra Russ MD.    CT Angiogram Chest With Contrast [[id number]] Collected:  05/31/17 1119     Updated:  05/31/17 1123    Narrative:       EXAMINATION: CT ANGIOGRAM CHEST W CONTRAST-      5/31/2017 10:12 AM CDT     HISTORY: Hypoxemia; R09.02-Hypoxemia     In order to have a CT radiation dose as low as reasonably achievable  Automated Exposure Control was utilized for adjustment of the mA and/or  KV according to patient size.     DLP in mGycm= 522.     CT angiography protocol.   CT imaging with bolus IV contrast injection.   Under concurrent supervision axial, sagittal, coronal, and  three-dimensional data sets were constructed.     Heart size is at the upper limits of normal.     No thoracic aortic aneurysm or dissection.     Symmetric pulmonary arteries.  Granulomatous calcification is seen at the right hilum.     IV contrast injection is suboptimal due to small IV size.  There is no evidence of pulmonary embolus.     There is posteromedial bibasilar infiltrate which has the appearance of  pneumonia on the left. On the right this could represent pneumonia or  atelectasis.     The upper lobes are  "clear.  There is no pneumothorax or pulmonary edema.     Summary:  1. Bibasilar infiltrate. Pneumonia is favored on the left.  2. No evidence of pulmonary embolism.                                   This report was finalized on 05/31/2017 11:20 by Dr. Bret Berger MD.            Condition on Discharge:    Stable    Physical Exam on Discharge:  /70 (BP Location: Right arm, Patient Position: Lying)  Pulse 81  Temp 98.2 °F (36.8 °C) (Oral)   Resp 20  Ht 65\" (165.1 cm)  Wt 230 lb 6.4 oz (105 kg)  LMP 05/22/2017 (Approximate)  SpO2 93%  Breastfeeding? No  BMI 38.34 kg/m2  Physical Exam  Constitutional: She is oriented to person, place, and time. She appears well-developed and well-nourished.   Up in bed. Comfortable.  No dyspnea noted. Discussed with her nurse.  Nasal O2 has been discontinued  Head: Normocephalic and atraumatic.   Eyes: Conjunctivae and EOM are normal. Pupils are equal, round, and reactive to light.   Neck: Neck supple.   Cardiovascular: Normal rate, regular rhythm, normal heart sounds and intact distal pulses. Exam reveals no gallop and no friction rub.   No murmur heard.  Pulmonary/Chest: Effort normal. No respiratory distress. She has no evidence of wheezing. She has no rales. She exhibits no tenderness.   Abdominal: Soft. Bowel sounds are normal. She exhibits no distension. There is no tenderness. There is no rebound and no guarding.   Musculoskeletal: Normal range of motion. She exhibits no edema, tenderness or deformity.   Neurological: She is alert and oriented to person, place, and time. She displays normal reflexes. No cranial nerve deficit. She exhibits normal muscle tone.   Skin: Skin is warm and dry. No rash noted.   Psychiatric: She has a normal mood and affect. Her behavior is normal. Judgment and thought content normal.     Discharge Disposition:  Home or Self Care    Discharge Medications:   Lucie Huizar   Home Medication Instructions DINO:[id number]    Printed " on:06/01/17 1041   Medication Information                      albuterol (PROVENTIL HFA;VENTOLIN HFA) 108 (90 BASE) MCG/ACT inhaler  Inhale 2 puffs Every 4 (Four) Hours As Needed for Wheezing or Shortness of Air.             amLODIPine (NORVASC) 10 MG tablet  Take 10 mg by mouth Daily.             cefdinir (OMNICEF) 300 MG capsule  Take 1 capsule by mouth Daily.             guaiFENesin (MUCINEX) 600 MG 12 hr tablet  Take 2 tablets by mouth Every 12 (Twelve) Hours.             hydrochlorothiazide (HYDRODIURIL) 25 MG tablet  Take 25 mg by mouth Daily.                 Discharge Diet:   Diet Instructions     Diet: Regular; Thin Liquids, No Restrictions       Discharge Diet:  Regular   Fluid Consistency:  Thin Liquids, No Restrictions                 Discharge Care Plan / Instructions:   Discharged to home    Activity at Discharge:   Activity Instructions     Activity as Tolerated           Discharge Activity Restrictions       2) Return to work in 5 days                 Follow-up Appointments:  Primary care physician in one week    Test Results Pending at Discharge:   None     Suleiman Venegas DO  06/01/17  10:41 AM    Time: Discharge 35 min    Please note that portions of this note may have been completed with a voice recognition program. Efforts were made to edit the dictations, but occasionally words are mistranscribed.             Electronically signed by Suleiman Venegas DO at 6/1/2017 10:52 AM

## 2018-03-13 ENCOUNTER — HOSPITAL ENCOUNTER (OUTPATIENT)
Dept: PHYSICAL THERAPY | Facility: HOSPITAL | Age: 33
Discharge: HOME | End: 2018-03-13

## 2018-03-13 PROCEDURE — 97750 PHYSICAL PERFORMANCE TEST: CPT

## 2019-10-21 ENCOUNTER — TELEPHONE (OUTPATIENT)
Dept: URGENT CARE | Facility: CLINIC | Age: 34
End: 2019-10-21

## 2019-10-21 NOTE — TELEPHONE ENCOUNTER
Patient called stating that she is still short of breath.  I asked patient if she was more short of breath than she was this morning and the patient stated no she just wasn't any better yet.  I explained that abx take 24-48 hours to take effect and that the steroid injection she was given might help but nothing would work magically.  I advised patient to go to ER if she was having any worsening symptoms.     Evonne Cook RN 10/21/2019 3:45 PM

## 2020-08-28 ENCOUNTER — TELEPHONE (OUTPATIENT)
Dept: URGENT CARE | Facility: CLINIC | Age: 35
End: 2020-08-28

## 2020-08-28 DIAGNOSIS — I10 ACCELERATED HYPERTENSION: Primary | ICD-10-CM

## 2020-08-28 RX ORDER — VALSARTAN AND HYDROCHLOROTHIAZIDE 80; 12.5 MG/1; MG/1
TABLET, FILM COATED ORAL
Qty: 60 TABLET | Refills: 0 | Status: SHIPPED | OUTPATIENT
Start: 2020-08-28

## 2020-08-28 RX ORDER — AMLODIPINE BESYLATE 10 MG/1
10 TABLET ORAL DAILY
Qty: 30 TABLET | Refills: 0 | Status: SHIPPED | OUTPATIENT
Start: 2020-08-28

## 2021-09-12 PROCEDURE — U0004 COV-19 TEST NON-CDC HGH THRU: HCPCS | Performed by: NURSE PRACTITIONER

## 2022-12-12 ENCOUNTER — APPOINTMENT (OUTPATIENT)
Dept: CT IMAGING | Facility: HOSPITAL | Age: 37
End: 2022-12-12

## 2022-12-12 ENCOUNTER — APPOINTMENT (OUTPATIENT)
Dept: GENERAL RADIOLOGY | Facility: HOSPITAL | Age: 37
End: 2022-12-12

## 2022-12-12 ENCOUNTER — HOSPITAL ENCOUNTER (EMERGENCY)
Facility: HOSPITAL | Age: 37
Discharge: HOME | End: 2022-12-12
Attending: STUDENT IN AN ORGANIZED HEALTH CARE EDUCATION/TRAINING PROGRAM | Admitting: STUDENT IN AN ORGANIZED HEALTH CARE EDUCATION/TRAINING PROGRAM

## 2022-12-12 VITALS
BODY MASS INDEX: 38.32 KG/M2 | SYSTOLIC BLOOD PRESSURE: 132 MMHG | DIASTOLIC BLOOD PRESSURE: 88 MMHG | HEIGHT: 65 IN | TEMPERATURE: 98 F | WEIGHT: 230 LBS | HEART RATE: 98 BPM | RESPIRATION RATE: 20 BRPM | OXYGEN SATURATION: 100 %

## 2022-12-12 DIAGNOSIS — J45.901 MODERATE ASTHMA WITH EXACERBATION, UNSPECIFIED WHETHER PERSISTENT: Primary | ICD-10-CM

## 2022-12-12 LAB
ANION GAP SERPL CALCULATED.3IONS-SCNC: 13 MMOL/L (ref 5–15)
BASOPHILS # BLD AUTO: 0.08 10*3/MM3 (ref 0–0.2)
BASOPHILS NFR BLD AUTO: 0.7 % (ref 0–1.5)
BUN SERPL-MCNC: 13 MG/DL (ref 6–20)
BUN/CREAT SERPL: 12.4 (ref 7–25)
CALCIUM SPEC-SCNC: 9.3 MG/DL (ref 8.6–10.5)
CHLORIDE SERPL-SCNC: 104 MMOL/L (ref 98–107)
CO2 SERPL-SCNC: 22 MMOL/L (ref 22–29)
CREAT SERPL-MCNC: 1.05 MG/DL (ref 0.57–1)
D DIMER PPP FEU-MCNC: 0.6 MCGFEU/ML (ref 0–0.5)
DEPRECATED RDW RBC AUTO: 46.2 FL (ref 37–54)
EGFRCR SERPLBLD CKD-EPI 2021: 69.9 ML/MIN/1.73
EOSINOPHIL # BLD AUTO: 0.6 10*3/MM3 (ref 0–0.4)
EOSINOPHIL NFR BLD AUTO: 5.1 % (ref 0.3–6.2)
ERYTHROCYTE [DISTWIDTH] IN BLOOD BY AUTOMATED COUNT: 13.7 % (ref 12.3–15.4)
FLUAV RNA RESP QL NAA+PROBE: NOT DETECTED
FLUBV RNA RESP QL NAA+PROBE: NOT DETECTED
GLUCOSE SERPL-MCNC: 143 MG/DL (ref 65–99)
HCG SERPL QL: NEGATIVE
HCT VFR BLD AUTO: 37.2 % (ref 34–46.6)
HGB BLD-MCNC: 12.3 G/DL (ref 12–15.9)
HOLD SPECIMEN: NORMAL
IMM GRANULOCYTES # BLD AUTO: 0.04 10*3/MM3 (ref 0–0.05)
IMM GRANULOCYTES NFR BLD AUTO: 0.3 % (ref 0–0.5)
LYMPHOCYTES # BLD AUTO: 3.25 10*3/MM3 (ref 0.7–3.1)
LYMPHOCYTES NFR BLD AUTO: 27.5 % (ref 19.6–45.3)
MCH RBC QN AUTO: 30.5 PG (ref 26.6–33)
MCHC RBC AUTO-ENTMCNC: 33.1 G/DL (ref 31.5–35.7)
MCV RBC AUTO: 92.3 FL (ref 79–97)
MONOCYTES # BLD AUTO: 0.98 10*3/MM3 (ref 0.1–0.9)
MONOCYTES NFR BLD AUTO: 8.3 % (ref 5–12)
NEUTROPHILS NFR BLD AUTO: 58.1 % (ref 42.7–76)
NEUTROPHILS NFR BLD AUTO: 6.86 10*3/MM3 (ref 1.7–7)
NRBC BLD AUTO-RTO: 0 /100 WBC (ref 0–0.2)
PLATELET # BLD AUTO: 504 10*3/MM3 (ref 140–450)
PMV BLD AUTO: 9.1 FL (ref 6–12)
POTASSIUM SERPL-SCNC: 3.4 MMOL/L (ref 3.5–5.2)
QT INTERVAL: 310 MS
QTC INTERVAL: 430 MS
RBC # BLD AUTO: 4.03 10*6/MM3 (ref 3.77–5.28)
SARS-COV-2 RNA RESP QL NAA+PROBE: NOT DETECTED
SODIUM SERPL-SCNC: 139 MMOL/L (ref 136–145)
TROPONIN T SERPL-MCNC: <0.01 NG/ML (ref 0–0.03)
TROPONIN T SERPL-MCNC: <0.01 NG/ML (ref 0–0.03)
WBC NRBC COR # BLD: 11.81 10*3/MM3 (ref 3.4–10.8)
WHOLE BLOOD HOLD COAG: NORMAL
WHOLE BLOOD HOLD SPECIMEN: NORMAL

## 2022-12-12 PROCEDURE — 84484 ASSAY OF TROPONIN QUANT: CPT | Performed by: STUDENT IN AN ORGANIZED HEALTH CARE EDUCATION/TRAINING PROGRAM

## 2022-12-12 PROCEDURE — 96374 THER/PROPH/DIAG INJ IV PUSH: CPT

## 2022-12-12 PROCEDURE — 85379 FIBRIN DEGRADATION QUANT: CPT | Performed by: STUDENT IN AN ORGANIZED HEALTH CARE EDUCATION/TRAINING PROGRAM

## 2022-12-12 PROCEDURE — 71275 CT ANGIOGRAPHY CHEST: CPT

## 2022-12-12 PROCEDURE — 80048 BASIC METABOLIC PNL TOTAL CA: CPT | Performed by: STUDENT IN AN ORGANIZED HEALTH CARE EDUCATION/TRAINING PROGRAM

## 2022-12-12 PROCEDURE — 36415 COLL VENOUS BLD VENIPUNCTURE: CPT

## 2022-12-12 PROCEDURE — 71045 X-RAY EXAM CHEST 1 VIEW: CPT

## 2022-12-12 PROCEDURE — 93005 ELECTROCARDIOGRAM TRACING: CPT | Performed by: STUDENT IN AN ORGANIZED HEALTH CARE EDUCATION/TRAINING PROGRAM

## 2022-12-12 PROCEDURE — 93010 ELECTROCARDIOGRAM REPORT: CPT | Performed by: EMERGENCY MEDICINE

## 2022-12-12 PROCEDURE — 94664 DEMO&/EVAL PT USE INHALER: CPT

## 2022-12-12 PROCEDURE — 84703 CHORIONIC GONADOTROPIN ASSAY: CPT | Performed by: STUDENT IN AN ORGANIZED HEALTH CARE EDUCATION/TRAINING PROGRAM

## 2022-12-12 PROCEDURE — 25010000002 METHYLPREDNISOLONE PER 125 MG: Performed by: STUDENT IN AN ORGANIZED HEALTH CARE EDUCATION/TRAINING PROGRAM

## 2022-12-12 PROCEDURE — 94799 UNLISTED PULMONARY SVC/PX: CPT

## 2022-12-12 PROCEDURE — 85025 COMPLETE CBC W/AUTO DIFF WBC: CPT | Performed by: STUDENT IN AN ORGANIZED HEALTH CARE EDUCATION/TRAINING PROGRAM

## 2022-12-12 PROCEDURE — 87636 SARSCOV2 & INF A&B AMP PRB: CPT | Performed by: STUDENT IN AN ORGANIZED HEALTH CARE EDUCATION/TRAINING PROGRAM

## 2022-12-12 PROCEDURE — 99284 EMERGENCY DEPT VISIT MOD MDM: CPT

## 2022-12-12 PROCEDURE — 0 IOPAMIDOL PER 1 ML: Performed by: STUDENT IN AN ORGANIZED HEALTH CARE EDUCATION/TRAINING PROGRAM

## 2022-12-12 PROCEDURE — 94640 AIRWAY INHALATION TREATMENT: CPT

## 2022-12-12 RX ORDER — ALBUTEROL SULFATE 2.5 MG/3ML
20 SOLUTION RESPIRATORY (INHALATION) ONCE
Status: COMPLETED | OUTPATIENT
Start: 2022-12-12 | End: 2022-12-12

## 2022-12-12 RX ORDER — METHYLPREDNISOLONE SODIUM SUCCINATE 125 MG/2ML
125 INJECTION, POWDER, LYOPHILIZED, FOR SOLUTION INTRAMUSCULAR; INTRAVENOUS ONCE
Status: COMPLETED | OUTPATIENT
Start: 2022-12-12 | End: 2022-12-12

## 2022-12-12 RX ORDER — BUPROPION HYDROCHLORIDE 150 MG/1
150 TABLET, EXTENDED RELEASE ORAL 2 TIMES DAILY
COMMUNITY

## 2022-12-12 RX ORDER — IPRATROPIUM BROMIDE AND ALBUTEROL SULFATE 2.5; .5 MG/3ML; MG/3ML
10 SOLUTION RESPIRATORY (INHALATION) ONCE
Status: DISCONTINUED | OUTPATIENT
Start: 2022-12-12 | End: 2022-12-12

## 2022-12-12 RX ORDER — IPRATROPIUM BROMIDE AND ALBUTEROL SULFATE 2.5; .5 MG/3ML; MG/3ML
3 SOLUTION RESPIRATORY (INHALATION) ONCE
Status: DISCONTINUED | OUTPATIENT
Start: 2022-12-12 | End: 2022-12-12 | Stop reason: SDUPTHER

## 2022-12-12 RX ADMIN — ALBUTEROL SULFATE 20 MG: 2.5 SOLUTION RESPIRATORY (INHALATION) at 05:18

## 2022-12-12 RX ADMIN — IOPAMIDOL 100 ML: 755 INJECTION, SOLUTION INTRAVENOUS at 06:38

## 2022-12-12 RX ADMIN — METHYLPREDNISOLONE SODIUM SUCCINATE 125 MG: 125 INJECTION, POWDER, FOR SOLUTION INTRAMUSCULAR; INTRAVENOUS at 04:58

## 2022-12-12 NOTE — ED PROVIDER NOTES
Subjective   History of Present Illness  Patient is that she has been having shortness of breath and cough since .  States that she has a history of asthma however her inhaler has been out and her insurance did not cover Symbicort.  States that she got worse over the past few days and so came in for further evaluation.  Denies any dysuria or hematuria hematochezia.  States that she does have chest tightness and shortness of breath when she takes to take a deep breath.  Denies any new calf swelling or tenderness.  Denies any fevers or chills.  Denies any severe headache or vision changes or neck stiffness or any rashes.        Review of Systems   All other systems reviewed and are negative.      Past Medical History:   Diagnosis Date   • Anxiety    • Bronchitis    • Depression    • Hypertension        No Known Allergies    Past Surgical History:   Procedure Laterality Date   • TUBAL ABDOMINAL LIGATION         History reviewed. No pertinent family history.    Social History     Socioeconomic History   • Marital status: Single   Tobacco Use   • Smoking status: Former     Types: Cigarettes     Quit date: 3/29/2017     Years since quittin.7   • Smokeless tobacco: Never   Substance and Sexual Activity   • Alcohol use: Yes     Alcohol/week: 8.0 standard drinks     Types: 8 Cans of beer per week     Comment: total, a couple times weekly    • Drug use: No   • Sexual activity: Defer           Objective   Physical Exam  Vitals and nursing note reviewed.   Constitutional:       General: She is in acute distress (due to wheezing).      Appearance: Normal appearance. She is not toxic-appearing or diaphoretic.   HENT:      Head: Normocephalic and atraumatic.      Nose: Nose normal.   Eyes:      General:         Right eye: No discharge.         Left eye: No discharge.      Extraocular Movements: Extraocular movements intact.      Conjunctiva/sclera: Conjunctivae normal.   Cardiovascular:      Rate and Rhythm:  Regular rhythm. Tachycardia present.      Pulses: Normal pulses.   Pulmonary:      Effort: Pulmonary effort is normal. No respiratory distress.      Breath sounds: Examination of the right-upper field reveals wheezing. Examination of the left-upper field reveals wheezing. Examination of the right-middle field reveals wheezing. Examination of the left-middle field reveals wheezing. Wheezing present. No rhonchi.   Abdominal:      General: Abdomen is flat.   Musculoskeletal:         General: Normal range of motion.      Cervical back: Normal range of motion.   Skin:     General: Skin is warm.   Neurological:      General: No focal deficit present.      Mental Status: She is alert and oriented to person, place, and time. Mental status is at baseline.   Psychiatric:         Mood and Affect: Mood normal.         Behavior: Behavior normal.         Thought Content: Thought content normal.         Judgment: Judgment normal.         Procedures           ED Course                                           MDM  Number of Diagnoses or Management Options  Moderate asthma with exacerbation, unspecified whether persistent  Diagnosis management comments: This is a 37yoF presenting with shortness of breath and cough.     The patient arrived hemodynamically stable and was afebrile.     The patient was then placed on the monitor, IV access was established, and O2 was titrated to 95% or greater if needed by supplemental oxygen.  _____  A 12-lead EKG was ordered and interpreted:   Rate 116. Rhythm sinus. No signs of acute ischemia such as new ST elevation, new ST depression, or new T wave inversions. No signs of WPW, Pericarditis, LV aneurysm, Brugada, Complete Heart Block, or Atrial fibrillation.  _____  With the history provided, current physical exam, and results so far, I have low suspicion for pneumothorax as the patient is saturating well without any increased O2 requirement, is not tachypneic, and has no radiographic evidence of a  pneumothorax.    With the history provided, current physical exam, and results so far, I have low suspicion for dissection there is no widened mediastinum, hypotension, pulse deficits, and no tearing type pain.    Patient was given a continuous DuoNeb treatment and Solu-Medrol with significant improvement in her symptoms.  Her chest x-ray does not reveal an obvious pneumonia.  Her D-dimer was slightly elevated at 0.6 and so CTA of the chest was performed. I went over the workup and results so far with the patient.     I told her that if her symptoms worsen she should come back.     I answered all her questions regarding the emergency department evaluation, diagnosis, and treatment plan in plain and simple language that she was able to understand.     I told her that there is always some diagnostic uncertainty in the ER and the fact that her symptoms may change or reveal themselves further after being discharged. Because of this, I told her that it is VERY IMPORTANT that she follows up (by calling to set up an appointment) with her primary care doctor within the next few days or as soon as reasonably possible so that she can be re-evaluated for improvement in her symptoms or for any other questions.     I also gave her common sense return precautions and told her to return to the emergency department within 24 - 48hrs if she has any new, worsening, or concerning symptoms.    she verbalized understanding of the discharge instructions and agreed with them.     she was discharged in stable condition and was observed ambulating out of the ER.      The patient was given a printed copy of discharge instructions.  _____      Signed:  Christina Carpio MD  Emergency Medicine Physician         Amount and/or Complexity of Data Reviewed  Clinical lab tests: reviewed and ordered  Tests in the radiology section of CPT®: reviewed and ordered  Tests in the medicine section of CPT®: reviewed        Final diagnoses:   Moderate asthma  with exacerbation, unspecified whether persistent       ED Disposition  ED Disposition     ED Disposition   Discharge    Condition   Stable    Comment   --             Donavon Weathers Jr., MD  [address] [place] [id number]  [phone]    Call in 1 day  As needed, If symptoms worsen         Medication List      No changes were made to your prescriptions during this visit.          Christina Carpio MD  12/15/22 [id number]

## 2024-11-22 ENCOUNTER — TELEPHONE (OUTPATIENT)
Dept: OBGYN CLINIC | Age: 39
End: 2024-11-22

## 2024-11-22 NOTE — TELEPHONE ENCOUNTER
Received referral in office. Called pt to schedule, said received a v/m stating insurance would not cover this. So provided pt w/ fin asst #. Pt said was going to speak w/ them first and get back to us if could get covered.